# Patient Record
Sex: FEMALE | Race: BLACK OR AFRICAN AMERICAN | URBAN - METROPOLITAN AREA
[De-identification: names, ages, dates, MRNs, and addresses within clinical notes are randomized per-mention and may not be internally consistent; named-entity substitution may affect disease eponyms.]

---

## 2018-10-14 ENCOUNTER — EMERGENCY (EMERGENCY)
Facility: HOSPITAL | Age: 53
LOS: 0 days | Discharge: HOME | End: 2018-10-14
Admitting: PHYSICIAN ASSISTANT

## 2018-10-14 VITALS
HEART RATE: 72 BPM | SYSTOLIC BLOOD PRESSURE: 146 MMHG | OXYGEN SATURATION: 99 % | RESPIRATION RATE: 18 BRPM | DIASTOLIC BLOOD PRESSURE: 81 MMHG | TEMPERATURE: 97 F

## 2018-10-14 DIAGNOSIS — Z91.048 OTHER NONMEDICINAL SUBSTANCE ALLERGY STATUS: ICD-10-CM

## 2018-10-14 DIAGNOSIS — R05 COUGH: ICD-10-CM

## 2018-10-14 DIAGNOSIS — R06.02 SHORTNESS OF BREATH: ICD-10-CM

## 2018-10-14 RX ORDER — IPRATROPIUM/ALBUTEROL SULFATE 18-103MCG
3 AEROSOL WITH ADAPTER (GRAM) INHALATION ONCE
Qty: 0 | Refills: 0 | Status: COMPLETED | OUTPATIENT
Start: 2018-10-14 | End: 2018-10-14

## 2018-10-14 RX ORDER — DEXAMETHASONE 0.5 MG/5ML
10 ELIXIR ORAL ONCE
Qty: 0 | Refills: 0 | Status: COMPLETED | OUTPATIENT
Start: 2018-10-14 | End: 2018-10-14

## 2018-10-14 RX ADMIN — Medication 3 MILLILITER(S): at 19:29

## 2018-10-14 RX ADMIN — Medication 10 MILLIGRAM(S): at 19:29

## 2018-10-14 NOTE — ED PROVIDER NOTE - MEDICAL DECISION MAKING DETAILS
54 yo F with PMHx of asthma presents c/o cough, wheezing and SOB x 1 week not improved with inhaler. Pt was seen in [facility] two days ago, was given inhaler but not given steroids. Pt given neb treatment and decadron with improvement in symptoms. CXR negative. Pt to follow-up with PMD tomorrow. Return precautions provided.

## 2018-10-14 NOTE — ED PROVIDER NOTE - PHYSICAL EXAMINATION
VITAL SIGNS: I have reviewed nursing notes and confirm.  CONSTITUTIONAL: Well-developed; well-nourished; in no acute distress.  SKIN: Skin exam is warm and dry, no acute rash.  HEAD: Normocephalic; atraumatic.  EYES: Conjunctiva and sclera clear.  ENT: No nasal discharge; airway clear.   NECK: Supple; non tender.  CARD: S1, S2 normal; no murmurs, gallops, or rubs. Regular rate and rhythm.  RESP: No rales or rhonchi. Speaking in full sentences.  (+) mild wheezing B/L.   EXT: Normal ROM. No clubbing, cyanosis or edema.  NEURO: Alert, oriented. Grossly unremarkable. No focal deficits.

## 2018-10-14 NOTE — ED PROVIDER NOTE - OBJECTIVE STATEMENT
52 yo F with PMHx of asthma, RA and Sjogren's presents to the ED c/o cough, wheezing and SOB x 1 week. Pt states symptoms are usually relieved with inhaler but it has not been helping all day. Pt denies other complaints. Pt denies fever, chills, nausea, vomiting, abdominal pain, headache, weakness, chest pain, back pain, LOC, trauma, urinary symptoms, calf pain/swelling, recent travel, recent surgery.

## 2018-10-14 NOTE — ED ADULT TRIAGE NOTE - CHIEF COMPLAINT QUOTE
Pt RX albuterol INH on Friday, has been having cough x 1 week, no relief of symptoms, h/o allergy induced asthma

## 2019-01-23 ENCOUNTER — EMERGENCY (EMERGENCY)
Facility: HOSPITAL | Age: 54
LOS: 0 days | Discharge: HOME | End: 2019-01-23
Attending: EMERGENCY MEDICINE | Admitting: EMERGENCY MEDICINE

## 2019-01-23 VITALS
OXYGEN SATURATION: 100 % | DIASTOLIC BLOOD PRESSURE: 85 MMHG | TEMPERATURE: 98 F | HEART RATE: 69 BPM | RESPIRATION RATE: 18 BRPM | SYSTOLIC BLOOD PRESSURE: 129 MMHG

## 2019-01-23 VITALS
HEART RATE: 70 BPM | SYSTOLIC BLOOD PRESSURE: 127 MMHG | DIASTOLIC BLOOD PRESSURE: 72 MMHG | OXYGEN SATURATION: 97 % | TEMPERATURE: 96 F | RESPIRATION RATE: 19 BRPM

## 2019-01-23 DIAGNOSIS — E78.00 PURE HYPERCHOLESTEROLEMIA, UNSPECIFIED: ICD-10-CM

## 2019-01-23 DIAGNOSIS — J45.909 UNSPECIFIED ASTHMA, UNCOMPLICATED: ICD-10-CM

## 2019-01-23 DIAGNOSIS — R20.2 PARESTHESIA OF SKIN: ICD-10-CM

## 2019-01-23 DIAGNOSIS — Z91.048 OTHER NONMEDICINAL SUBSTANCE ALLERGY STATUS: ICD-10-CM

## 2019-01-23 LAB
ALBUMIN SERPL ELPH-MCNC: 4.3 G/DL — SIGNIFICANT CHANGE UP (ref 3.5–5.2)
ALP SERPL-CCNC: 69 U/L — SIGNIFICANT CHANGE UP (ref 30–115)
ALT FLD-CCNC: 19 U/L — SIGNIFICANT CHANGE UP (ref 0–41)
ANION GAP SERPL CALC-SCNC: 15 MMOL/L — HIGH (ref 7–14)
AST SERPL-CCNC: 20 U/L — SIGNIFICANT CHANGE UP (ref 0–41)
BASOPHILS # BLD AUTO: 0.03 K/UL — SIGNIFICANT CHANGE UP (ref 0–0.2)
BASOPHILS NFR BLD AUTO: 0.6 % — SIGNIFICANT CHANGE UP (ref 0–1)
BILIRUB SERPL-MCNC: 0.7 MG/DL — SIGNIFICANT CHANGE UP (ref 0.2–1.2)
BUN SERPL-MCNC: 16 MG/DL — SIGNIFICANT CHANGE UP (ref 10–20)
CALCIUM SERPL-MCNC: 9.7 MG/DL — SIGNIFICANT CHANGE UP (ref 8.5–10.1)
CHLORIDE SERPL-SCNC: 100 MMOL/L — SIGNIFICANT CHANGE UP (ref 98–110)
CO2 SERPL-SCNC: 26 MMOL/L — SIGNIFICANT CHANGE UP (ref 17–32)
CREAT SERPL-MCNC: 0.8 MG/DL — SIGNIFICANT CHANGE UP (ref 0.7–1.5)
EOSINOPHIL # BLD AUTO: 0.14 K/UL — SIGNIFICANT CHANGE UP (ref 0–0.7)
EOSINOPHIL NFR BLD AUTO: 3 % — SIGNIFICANT CHANGE UP (ref 0–8)
GLUCOSE SERPL-MCNC: 124 MG/DL — HIGH (ref 70–99)
HCT VFR BLD CALC: 37.3 % — SIGNIFICANT CHANGE UP (ref 37–47)
HGB BLD-MCNC: 12 G/DL — SIGNIFICANT CHANGE UP (ref 12–16)
IMM GRANULOCYTES NFR BLD AUTO: 0.2 % — SIGNIFICANT CHANGE UP (ref 0.1–0.3)
LYMPHOCYTES # BLD AUTO: 1.72 K/UL — SIGNIFICANT CHANGE UP (ref 1.2–3.4)
LYMPHOCYTES # BLD AUTO: 37.1 % — SIGNIFICANT CHANGE UP (ref 20.5–51.1)
MAGNESIUM SERPL-MCNC: 2.1 MG/DL — SIGNIFICANT CHANGE UP (ref 1.8–2.4)
MCHC RBC-ENTMCNC: 26.1 PG — LOW (ref 27–31)
MCHC RBC-ENTMCNC: 32.2 G/DL — SIGNIFICANT CHANGE UP (ref 32–37)
MCV RBC AUTO: 81.3 FL — SIGNIFICANT CHANGE UP (ref 81–99)
MONOCYTES # BLD AUTO: 0.28 K/UL — SIGNIFICANT CHANGE UP (ref 0.1–0.6)
MONOCYTES NFR BLD AUTO: 6 % — SIGNIFICANT CHANGE UP (ref 1.7–9.3)
NEUTROPHILS # BLD AUTO: 2.45 K/UL — SIGNIFICANT CHANGE UP (ref 1.4–6.5)
NEUTROPHILS NFR BLD AUTO: 53.1 % — SIGNIFICANT CHANGE UP (ref 42.2–75.2)
NRBC # BLD: 0 /100 WBCS — SIGNIFICANT CHANGE UP (ref 0–0)
PLATELET # BLD AUTO: 273 K/UL — SIGNIFICANT CHANGE UP (ref 130–400)
POTASSIUM SERPL-MCNC: 4.2 MMOL/L — SIGNIFICANT CHANGE UP (ref 3.5–5)
POTASSIUM SERPL-SCNC: 4.2 MMOL/L — SIGNIFICANT CHANGE UP (ref 3.5–5)
PROT SERPL-MCNC: 7.1 G/DL — SIGNIFICANT CHANGE UP (ref 6–8)
RBC # BLD: 4.59 M/UL — SIGNIFICANT CHANGE UP (ref 4.2–5.4)
RBC # FLD: 15.4 % — HIGH (ref 11.5–14.5)
SODIUM SERPL-SCNC: 141 MMOL/L — SIGNIFICANT CHANGE UP (ref 135–146)
WBC # BLD: 4.63 K/UL — LOW (ref 4.8–10.8)
WBC # FLD AUTO: 4.63 K/UL — LOW (ref 4.8–10.8)

## 2019-01-23 NOTE — ED PROVIDER NOTE - OBJECTIVE STATEMENT
54 y/o F with PMH seasonal asthma, RA not on any meds including steroids or immunosuppressants, Sjogren's, HLD presents with b/l humeral, top of head, and L shin warm/tingling sensation x days. called PMD who recommended ED for eval. Denies CP, palpitations, SOB, back pain, abdominal pain, n/v/d, black or bloody stools, fevers, sweats, chills, HA, vision changes, confusion, trauma, fall, cough, recent travel, recent illness, sick contacts, leg pain/swelling, urinary symptoms, rash. pt does also relate she has hx of vertiginous symptoms and is followed by neuro, last brain MRI 2 years ago and WNL.

## 2019-01-23 NOTE — ED PROVIDER NOTE - NS ED ROS FT
Review of Systems    Constitutional: (-) fever  Eyes/ENT: (-) blurry vision  Cardiovascular: (-) chest pain, (-) syncope  Respiratory: (-) cough, (-) shortness of breath  Gastrointestinal: (-) vomiting, (-) diarrhea  Musculoskeletal: (-) neck pain, (-) back pain  Integumentary: (-) rash, (-) edema  Neurological: (-) headache    **All other ROS negative except as per above/HPI**

## 2019-01-23 NOTE — ED PROVIDER NOTE - NSFOLLOWUPINSTRUCTIONS_ED_ALL_ED_FT
Paresthesia  ImageParesthesia is a burning or prickling feeling. This feeling can happen in any part of the body. It often happens in the hands, arms, legs, or feet. Usually, it is not painful. In most cases, the feeling goes away in a short time and is not a sign of a serious problem.    Follow these instructions at home:  Avoid drinking alcohol.  Try massage or needle therapy (acupuncture) to help with your problems.  Keep all follow-up visits as told by your doctor. This is important.  Contact a doctor if:  You keep on having episodes of paresthesia.  Your burning or prickling feeling gets worse when you walk.  You have pain or cramps.  You feel dizzy.  You have a rash.  Get help right away if:  You feel weak.  You have trouble walking or moving.  You have problems speaking, understanding, or seeing.  You feel confused.  You cannot control when you pee (urinate) or poop (bowel movement).  You lose feeling (numbness) after an injury.  You pass out (faint).  This information is not intended to replace advice given to you by your health care provider. Make sure you discuss any questions you have with your health care provider.

## 2019-01-23 NOTE — ED PROVIDER NOTE - PROGRESS NOTE DETAILS
Reviewed all results and necessity for follow up. Counseled on red flags and to return for them.  Patient appears well on discharge. pt understands need to follow with neuro, and has neuro that she follows with .

## 2019-01-23 NOTE — ED PROVIDER NOTE - PHYSICAL EXAMINATION
PHYSICAL EXAM:    GENERAL: Alert, appears stated age, well appearing, non-toxic  SKIN: Warm, pink and dry. MMM.   EYE: Normal lids/conjunctiva, PERRL, EOMI  ENT: Normal hearing, patent oropharynx  NECK: +supple. No meningismus   Pulm: Bilateral BS, normal resp effort, no wheezes, stridor, or retractions  CV: RRR, no M/R/G, 2+and = radial pulses  Abd: soft, non-tender, non-distended  Mskel: no erythema, cyanosis, edema. no calf tenderness  Neuro: AAOx3, no sensory/motor deficits, CN 2-12 intact. No speech slurring, pronator drift, facial asymmetry. normal finger-to-nose b/l. 5/5 strength throughout. normal gait. negative romberg.

## 2019-01-23 NOTE — ED PROVIDER NOTE - ATTENDING CONTRIBUTION TO CARE
52 yo f hx of RA, c/o tingling, sharp pain, lasts for a few seconds, intermitt for 2 days, on her scalp, b/l arms and left leg. no trauma, numbness, weakness. no ha, vis or speech changes. no n, v, cp, sob or back pain. pt in nad, ncat, perrl, eomi, CNs nml, neck sup, no bruits or thrills, ctab, rrr, ab soft, nt, nd. Alert and oriented, face symmetric and speech fluent. Strength 5/5 x 4 ext and symmetric, nml gross motor movement, nml RRM/ANALISA/FTN, nml gait. nml 2 pt discrim. No focal deficits noted.

## 2019-01-23 NOTE — ED PROVIDER NOTE - NSFOLLOWUPCLINICS_GEN_ALL_ED_FT
A Family Medicine Doctor  Family Medicine  .  [place]   Phone:   Fax:   Follow Up Time: 1-3 Days    [facility]  Neurology  [address] [place] [address], [facility] [address]  [place] [address]  Phone: [phone]  Fax:   Follow Up Time: 1-3 Days

## 2019-01-24 PROBLEM — J45.909 UNSPECIFIED ASTHMA, UNCOMPLICATED: Chronic | Status: ACTIVE | Noted: 2018-10-14

## 2019-03-19 ENCOUNTER — EMERGENCY (EMERGENCY)
Facility: HOSPITAL | Age: 54
LOS: 0 days | Discharge: HOME | End: 2019-03-20
Attending: EMERGENCY MEDICINE | Admitting: EMERGENCY MEDICINE

## 2019-03-19 VITALS
HEART RATE: 81 BPM | DIASTOLIC BLOOD PRESSURE: 63 MMHG | RESPIRATION RATE: 18 BRPM | TEMPERATURE: 97 F | SYSTOLIC BLOOD PRESSURE: 127 MMHG | OXYGEN SATURATION: 100 %

## 2019-03-19 VITALS
TEMPERATURE: 97 F | SYSTOLIC BLOOD PRESSURE: 130 MMHG | HEART RATE: 87 BPM | RESPIRATION RATE: 18 BRPM | OXYGEN SATURATION: 97 % | DIASTOLIC BLOOD PRESSURE: 77 MMHG

## 2019-03-19 DIAGNOSIS — J98.01 ACUTE BRONCHOSPASM: ICD-10-CM

## 2019-03-19 DIAGNOSIS — R05 COUGH: ICD-10-CM

## 2019-03-19 DIAGNOSIS — J45.909 UNSPECIFIED ASTHMA, UNCOMPLICATED: ICD-10-CM

## 2019-03-19 DIAGNOSIS — Z79.899 OTHER LONG TERM (CURRENT) DRUG THERAPY: ICD-10-CM

## 2019-03-19 DIAGNOSIS — Z79.52 LONG TERM (CURRENT) USE OF SYSTEMIC STEROIDS: ICD-10-CM

## 2019-03-19 RX ORDER — IPRATROPIUM/ALBUTEROL SULFATE 18-103MCG
3 AEROSOL WITH ADAPTER (GRAM) INHALATION ONCE
Qty: 0 | Refills: 0 | Status: COMPLETED | OUTPATIENT
Start: 2019-03-19 | End: 2019-03-19

## 2019-03-19 RX ADMIN — Medication 3 MILLILITER(S): at 22:11

## 2019-03-19 RX ADMIN — Medication 3 MILLILITER(S): at 23:27

## 2019-03-19 NOTE — ED ADULT TRIAGE NOTE - CHIEF COMPLAINT QUOTE
Pt with C/O flu like symptoms coughing sinus and chest congestion . on po antibiotic for DX- PNA still not filling well .

## 2019-03-19 NOTE — ED ADULT NURSE NOTE - NSIMPLEMENTINTERV_GEN_ALL_ED
Implemented All Universal Safety Interventions:  [place] to call system. Call bell, personal items and telephone within reach. Instruct patient to call for assistance. Room bathroom lighting operational. Non-slip footwear when patient is off stretcher. Physically safe environment: no spills, clutter or unnecessary equipment. Stretcher in lowest position, wheels locked, appropriate side rails in place.

## 2019-03-20 PROBLEM — M53.9 DORSOPATHY, UNSPECIFIED: Chronic | Status: ACTIVE | Noted: 2019-01-23

## 2019-03-20 PROBLEM — R42 DIZZINESS AND GIDDINESS: Chronic | Status: ACTIVE | Noted: 2019-01-23

## 2019-03-20 PROBLEM — M06.9 RHEUMATOID ARTHRITIS, UNSPECIFIED: Chronic | Status: ACTIVE | Noted: 2019-01-23

## 2019-03-20 PROBLEM — E78.00 PURE HYPERCHOLESTEROLEMIA, UNSPECIFIED: Chronic | Status: ACTIVE | Noted: 2019-01-23

## 2019-03-20 RX ORDER — ALBUTEROL 90 UG/1
2 AEROSOL, METERED ORAL
Qty: 1 | Refills: 0
Start: 2019-03-20 | End: 2019-04-18

## 2019-03-20 RX ORDER — ALBUTEROL 90 UG/1
1 AEROSOL, METERED ORAL ONCE
Qty: 0 | Refills: 0 | Status: DISCONTINUED | OUTPATIENT
Start: 2019-03-20 | End: 2019-03-20

## 2019-03-20 NOTE — ED PROVIDER NOTE - NS ED ROS FT
Constitutional: no fever, chills, no recent weight loss, change in appetite or malaise  Cardiac: No chest pain, SOB or edema.  Respiratory: see HPI  ENT: nasal congestion, laryngitis, no throat or ear pain  GI: No nausea, vomiting, diarrhea or abdominal pain.  : No dysuria, frequency, urgency or hematuria  MS: no pain to back or extremities, no loss of ROM, no weakness  Neuro: No headache or weakness. No LOC.  Skin: No skin rash.  Except as documented in the HPI, all other systems are negative.

## 2019-03-20 NOTE — ED PROVIDER NOTE - CARE PROVIDER_API CALL
Preston Rice (MD)  Internal Medicine  [address] [place] [address]  Phone: [phone]  Fax: [phone]  Follow Up Time:

## 2019-03-20 NOTE — ED PROVIDER NOTE - CLINICAL SUMMARY MEDICAL DECISION MAKING FREE TEXT BOX
The patient received duoneb x 2, cough improving, no longer wheezing, subjectively states she is able to take a much deeper breath.     Likely post viral cough, XR does not demonstrate localized pneumonia.     Patient will follow up with Dr. Rice, contineued prescribed meds, will dc with albuterol MDI and close return precautions.

## 2019-03-20 NOTE — ED PROVIDER NOTE - OBJECTIVE STATEMENT
52 yo F, history of RA, Sjogren's disease not on any DMARDs, allergy related asthma, recently diagnosed with flu and then ?PNA, here for assessment of persistent cough since treatment of PNA. Reports cough productive of clear/white sputum, worse at night, associated post nasal drip, laryngitis. No CP.     Had fevers with initial sx, no fever x 5 days.     No nausea, vomiting, diarrhea or abdominal pain.    No recent travel, sick contacts, got flu shot.    Was seen by Dr. Rice for sx 10 days ago, completed course of cephalosporin and prednisone. No albuterol. Spoke with him over the phone regarding persistent cough, given rx for additional prednisone course and was started on levoquin.

## 2019-03-20 NOTE — ED PROVIDER NOTE - PHYSICAL EXAMINATION
VITAL SIGNS: I have reviewed nursing notes and confirm.  CONSTITUTIONAL: Well-developed; well-nourished; in no acute distress, well hydrated  SKIN: Skin exam is warm and dry, no acute rash, good turgor  HEAD: Normocephalic; atraumatic.  EYES: PERRL, EOM intact; conjunctiva and sclera clear.  ENT: clear rhinorrhea, edematous turbinates, no pharyngeal eyrthema, normal appearing TMs  NECK: Supple; non tender, no significant adenopathy  CARD: S1, S2 normal; no murmurs, gallops, or rubs. Regular rate and rhythm.  RESP: trace wheezing diffusely, rales or rhonchi.  ABD: Normal bowel sounds; soft; non-distended; non-tender  EXT: Normal ROM.   NEURO: Alert, oriented. Grossly unremarkable. No focal deficits.  PSYCH: Cooperative, appropriate.

## 2019-03-21 NOTE — ED POST DISCHARGE NOTE - RESULT SUMMARY
CXR- LEFT LOWER LOBE OPACITY MAY REFLECT PNA. ON LEVAQUIN BY DR. NASH. WILL ADVISE F/U WITH DR. NASH WITHIN 24 HOURS.

## 2019-09-11 PROBLEM — Z00.00 ENCOUNTER FOR PREVENTIVE HEALTH EXAMINATION: Status: ACTIVE | Noted: 2019-09-11

## 2019-09-20 ENCOUNTER — EMERGENCY (EMERGENCY)
Facility: HOSPITAL | Age: 54
LOS: 0 days | Discharge: HOME | End: 2019-09-20
Attending: EMERGENCY MEDICINE | Admitting: EMERGENCY MEDICINE
Payer: COMMERCIAL

## 2019-09-20 VITALS
RESPIRATION RATE: 18 BRPM | OXYGEN SATURATION: 100 % | HEART RATE: 73 BPM | DIASTOLIC BLOOD PRESSURE: 73 MMHG | SYSTOLIC BLOOD PRESSURE: 124 MMHG | TEMPERATURE: 98 F

## 2019-09-20 VITALS
HEIGHT: 55 IN | TEMPERATURE: 98 F | HEART RATE: 63 BPM | SYSTOLIC BLOOD PRESSURE: 133 MMHG | WEIGHT: 229.94 LBS | DIASTOLIC BLOOD PRESSURE: 76 MMHG | OXYGEN SATURATION: 100 % | RESPIRATION RATE: 18 BRPM

## 2019-09-20 DIAGNOSIS — R07.9 CHEST PAIN, UNSPECIFIED: ICD-10-CM

## 2019-09-20 DIAGNOSIS — R51 HEADACHE: ICD-10-CM

## 2019-09-20 DIAGNOSIS — M06.9 RHEUMATOID ARTHRITIS, UNSPECIFIED: ICD-10-CM

## 2019-09-20 DIAGNOSIS — E78.5 HYPERLIPIDEMIA, UNSPECIFIED: ICD-10-CM

## 2019-09-20 DIAGNOSIS — R11.0 NAUSEA: ICD-10-CM

## 2019-09-20 DIAGNOSIS — R55 SYNCOPE AND COLLAPSE: ICD-10-CM

## 2019-09-20 DIAGNOSIS — R42 DIZZINESS AND GIDDINESS: ICD-10-CM

## 2019-09-20 DIAGNOSIS — H81.90 UNSPECIFIED DISORDER OF VESTIBULAR FUNCTION, UNSPECIFIED EAR: ICD-10-CM

## 2019-09-20 LAB
ALBUMIN SERPL ELPH-MCNC: 4.3 G/DL — SIGNIFICANT CHANGE UP (ref 3.5–5.2)
ALP SERPL-CCNC: 66 U/L — SIGNIFICANT CHANGE UP (ref 30–115)
ALT FLD-CCNC: 17 U/L — SIGNIFICANT CHANGE UP (ref 0–41)
ANION GAP SERPL CALC-SCNC: 9 MMOL/L — SIGNIFICANT CHANGE UP (ref 7–14)
AST SERPL-CCNC: 21 U/L — SIGNIFICANT CHANGE UP (ref 0–41)
BILIRUB SERPL-MCNC: 1 MG/DL — SIGNIFICANT CHANGE UP (ref 0.2–1.2)
BUN SERPL-MCNC: 14 MG/DL — SIGNIFICANT CHANGE UP (ref 10–20)
CALCIUM SERPL-MCNC: 9.2 MG/DL — SIGNIFICANT CHANGE UP (ref 8.5–10.1)
CHLORIDE SERPL-SCNC: 107 MMOL/L — SIGNIFICANT CHANGE UP (ref 98–110)
CO2 SERPL-SCNC: 26 MMOL/L — SIGNIFICANT CHANGE UP (ref 17–32)
CREAT SERPL-MCNC: 0.8 MG/DL — SIGNIFICANT CHANGE UP (ref 0.7–1.5)
GLUCOSE SERPL-MCNC: 95 MG/DL — SIGNIFICANT CHANGE UP (ref 70–99)
HCT VFR BLD CALC: 38.7 % — SIGNIFICANT CHANGE UP (ref 37–47)
HGB BLD-MCNC: 12 G/DL — SIGNIFICANT CHANGE UP (ref 12–16)
MCHC RBC-ENTMCNC: 25.7 PG — LOW (ref 27–31)
MCHC RBC-ENTMCNC: 31 G/DL — LOW (ref 32–37)
MCV RBC AUTO: 82.9 FL — SIGNIFICANT CHANGE UP (ref 81–99)
NRBC # BLD: 0 /100 WBCS — SIGNIFICANT CHANGE UP (ref 0–0)
PLATELET # BLD AUTO: 251 K/UL — SIGNIFICANT CHANGE UP (ref 130–400)
POTASSIUM SERPL-MCNC: 4.5 MMOL/L — SIGNIFICANT CHANGE UP (ref 3.5–5)
POTASSIUM SERPL-SCNC: 4.5 MMOL/L — SIGNIFICANT CHANGE UP (ref 3.5–5)
PROT SERPL-MCNC: 6.9 G/DL — SIGNIFICANT CHANGE UP (ref 6–8)
RBC # BLD: 4.67 M/UL — SIGNIFICANT CHANGE UP (ref 4.2–5.4)
RBC # FLD: 15.6 % — HIGH (ref 11.5–14.5)
SODIUM SERPL-SCNC: 142 MMOL/L — SIGNIFICANT CHANGE UP (ref 135–146)
TROPONIN T SERPL-MCNC: <0.01 NG/ML — SIGNIFICANT CHANGE UP
WBC # BLD: 3.69 K/UL — LOW (ref 4.8–10.8)
WBC # FLD AUTO: 3.69 K/UL — LOW (ref 4.8–10.8)

## 2019-09-20 PROCEDURE — 71046 X-RAY EXAM CHEST 2 VIEWS: CPT | Mod: 26

## 2019-09-20 PROCEDURE — 93970 EXTREMITY STUDY: CPT | Mod: 26

## 2019-09-20 PROCEDURE — 75574 CT ANGIO HRT W/3D IMAGE: CPT | Mod: 26

## 2019-09-20 PROCEDURE — 93010 ELECTROCARDIOGRAM REPORT: CPT

## 2019-09-20 PROCEDURE — 99236 HOSP IP/OBS SAME DATE HI 85: CPT

## 2019-09-20 RX ORDER — ACETAMINOPHEN 500 MG
650 TABLET ORAL ONCE
Refills: 0 | Status: COMPLETED | OUTPATIENT
Start: 2019-09-20 | End: 2019-09-20

## 2019-09-20 RX ADMIN — Medication 650 MILLIGRAM(S): at 11:25

## 2019-09-20 NOTE — ED PROVIDER NOTE - PHYSICAL EXAMINATION
Constitutional: Well developed, well nourished. NAD  Head: Normocephalic, atraumatic.  Eyes: PERRL, EOMI.  ENT: No nasal discharge. Mucous membranes dry.  Neck: Supple. Painless ROM.  Cardiovascular: Normal S1, S2. Regular rate and rhythm. No murmurs, rubs, or gallops.  Pulmonary:  Lungs clear to auscultation bilaterally.   Abdominal: Soft. Nondistended. No rebound, guarding, rigidity.  Extremities. Pelvis stable. No lower extremity edema, symmetric calves.  Skin: No rashes, cyanosis.  Neuro: AAOx3. No focal neurological deficits.  Psych: Normal mood. Normal affect.

## 2019-09-20 NOTE — ED ADULT NURSE NOTE - OBJECTIVE STATEMENT
patient complaints of chest pain, tightness and intermittent SOB. Patient reports headache and feeling a burning sensation in her head. Patient denies n/v.  Patient reports lightheadness.

## 2019-09-20 NOTE — ED PROVIDER NOTE - ATTENDING CONTRIBUTION TO CARE
I personally evaluated the patient. I reviewed the Resident’s or Physician Assistant’s note (as assigned above), and agree with the findings and plan except as documented in my note.  54 yr F with complaints of non radiating CP, SOB. Reports feeling lightheaded, +  syncope 1 week ago. No aggravating or alleviating factors. VS reviewed, pt non-toxic appearing, NAD. Head ncat, MMM, pharyngeal exam w/o erythema, edema or exudates. B/l TM wnl. neck supple, normal ROM, normal s1s2 without any murmurs, Lungs CTAB with normal work of breathing. abd +BS, s/nd/nt, extremities wnl, neuro exam grossly normal. No acute skin rashes. Plan is EKG, labs, CXR, cardiac monitoring and will reassess.

## 2019-09-20 NOTE — ED CDU PROVIDER DISPOSITION NOTE - CLINICAL COURSE
55yo woman h/o HLD, RA (no meds), vestibulopathy c/o typical vestibular sx (for her) over the past couple of weeks with dizziness, nausea, clammy sensation. She has had recurrent sx over the past 12 years, usually with the change of season or with highly humid weather. Last night pt woke up feeling SOB with some mild chest tightness and became concerned. No wheezing or cough, sx lasted about 30 minutes. VS, exam as noted, nontoxic appearing and comfortable, normal work of breathing, lungs CTA, CSV1S2 RRR abd soft, NT, no peripheral edema. Mild L calf tenderness which pt says has been chronic for months, no overlying erythema. Pt was monitored in CDU wihtout incident, repeat EKG/enzymes unchanged. Duplex negative for DVT. CCTA with CAD-RAD 0. Pt feeling better at time of dispo, d/c to f/u PMD. Pt comfortable with d/c.

## 2019-09-20 NOTE — ED CDU PROVIDER DISPOSITION NOTE - NS ED ATTENDING STATEMENT MOD
/111
42 beats of AIVR noted on telemetry at 22:24. Patient asleep in bed at time of ectopy. Patient awakened for vital sign assessment. Patient asymptomatic. Returned to sinus rhythm with HR 92 bpm.
Patient has a positive blood culture with gram positive cocci.
Patient has been experiencing seizures lasting more than 5 minutes s/p 1mg ativan IVP.
fingerstick 450 at 21:49 with 4 units of Humalog insulin given per corrective regimen sliding scale
fingerstick re-check at 23:38 resulted 377
further directions needed for heparin drip
ptt in goal range x 1, heparin drip currently at 13ml/hr
I have personally performed a face to face diagnostic evaluation on this patient. I have reviewed the ACP note and agree with the history, exam and plan of care, except as noted.

## 2019-09-20 NOTE — ED CDU PROVIDER DISPOSITION NOTE - CARE PROVIDER_API CALL
Derrell Perez)  Cardiovascular Disease; Internal Medicine  [address] [place] [address]  Phone: [phone]  Fax: [phone]  Follow Up Time:

## 2019-09-20 NOTE — ED ADULT NURSE NOTE - CHIEF COMPLAINT QUOTE
c/o sob x few days. c/o chest tightness since tonight. also c/o heacaches. pt states " I feel like my head is on fire".

## 2019-09-20 NOTE — ED PROVIDER NOTE - OBJECTIVE STATEMENT
54 yold female to Ed Pmhx Vestibulopathy, HLd, c/o lightheaded, dizzy, Ha, nausea and near syncope x 1 week came specifically today because she had chest tightness with mild sob; pt with extensive neuro workup in past with DR. Randle for vertigo; no specific cause found; pt also seen in local [facility] given meclazine 2 days ago(has not started yet); pt denies fever, chills, cough; pt last workup for cP ~5 yrs ago

## 2019-09-20 NOTE — ED CDU PROVIDER INITIAL DAY NOTE - OBJECTIVE STATEMENT
55y/o female with pmh of hld, vestibulopathy, pt. c/o feeling near syncopal, ha, dizziness and nausea. pt. states all these symptoms are consistent with her vestibulopathy. 2 days ago pt. went to urgent care and was given rx for meclizine and gabapentin. pt. filled it in but did not start taking it. the reason pt. came to er today is because of cp associated with sob and left leg pain. denies fever, chills, cough, abdominal pain. not a smoker. no cardiologist. pt. had a ccta 5 years ago. 53y/o female with pmh of hld, vestibulopathy, pt. c/o feeling near syncopal, ha, dizziness and nausea. pt. states all these symptoms are consistent with her vestibulopathy. 2 days ago pt. went to urgent care and was given rx for meclizine and gabapentin. pt. filled it in but did not start taking it. the reason pt. came to er today is because of cp associated with sob and left leg pain. denies fever, chills, cough, abdominal pain. pt. also denies hormone use, recent travel, hx of cancers, no hemoptysis. pt.'s younger sister was diagnosed with a dvt. pt. is not a smoker. no cardiologist. pt. had a ccta 5 years ago.

## 2019-09-20 NOTE — ED CDU PROVIDER INITIAL DAY NOTE - PROGRESS NOTE DETAILS
pt seen bedside, NAD, no complaints came in due to her SOB yesterday pt being worked up for r/o DVT will follow pending duplex and continue to monitor patient.

## 2019-09-20 NOTE — ED CDU PROVIDER INITIAL DAY NOTE - MUSCULOSKELETAL, MLM
Spine appears normal, range of motion is not limited, no muscle or joint tenderness. no swelling to LE, no calf ttp.

## 2019-09-20 NOTE — ED CDU PROVIDER INITIAL DAY NOTE - MEDICAL DECISION MAKING DETAILS
53yo woman h/o HLD, RA (no meds), vestibulopathy c/o typical vestibular sx (for her) over the past couple of weeks with dizziness, nausea, clammy sensation. She has had recurrent sx over the past 12 years, usually with the change of season or with highly humid weather. Last night pt woke up feeling SOB with some mild chest tightness and became concerned. No wheezing or cough, sx lasted about 30 minutes. VS, exam as noted, nontoxic appearing and comfortable, normal work of breathing, lungs CTA, CSV1S2 RRR abd soft, NT, no peripheral edema. Mild L calf tenderness which pt says has been chronic for months, no overlying erythema. Doubt PE, but will check duplex due to calf pain. Plan is for serial EKG/enzymes, CCTA, reassess.

## 2019-09-20 NOTE — ED ADULT TRIAGE NOTE - CHIEF COMPLAINT QUOTE
Left detailed message and requested a call back to office.  Hours provided.  Lab order faxed to [facility]. c/o sob x few days. c/o chest tightness since tonight. also c/o heacaches. pt states " I feel like my head is on fire".

## 2019-09-20 NOTE — ED ADULT NURSE REASSESSMENT NOTE - NS ED NURSE REASSESS COMMENT FT1
patient placed in observation for further observation for chest pain. Patient remains on cardiac monitor.  Patient denies pain at this time. Patient denies SOB.  Patient resting in bed with no difficulty breathing noted. Will continue to monitor.
pt assessed, no signs of distress, cont cardiac monitoring maintained, awaiting venous duplex, will cont to assess and monitor.

## 2019-09-20 NOTE — ED PROVIDER NOTE - CLINICAL SUMMARY MEDICAL DECISION MAKING FREE TEXT BOX
Pt with complaints of CP and SOB. Required labs, imaging, monitoring in ED. Will place in EDOU for further management.

## 2019-09-20 NOTE — ED CDU PROVIDER DISPOSITION NOTE - ATTENDING CONTRIBUTION TO CARE
53yo woman h/o HLD, RA (no meds), vestibulopathy c/o typical vestibular sx (for her) over the past couple of weeks with dizziness, nausea, clammy sensation. She has had recurrent sx over the past 12 years, usually with the change of season or with highly humid weather. Last night pt woke up feeling SOB with some mild chest tightness and became concerned. No wheezing or cough, sx lasted about 30 minutes. VS, exam as noted, nontoxic appearing and comfortable, normal work of breathing, lungs CTA, CSV1S2 RRR abd soft, NT, no peripheral edema. Mild L calf tenderness which pt says has been chronic for months, no overlying erythema. Pt was monitored in CDU wihtout incident, repeat EKG/enzymes unchanged. Duplex negative for DVT. CCTA with CAD-RAD 0. Pt feeling better at time of dispo, d/c to f/u PMD. Pt comfortable with d/c.

## 2019-09-20 NOTE — ED CDU PROVIDER DISPOSITION NOTE - PATIENT PORTAL LINK FT
You can access the FollowMyHealth Patient Portal offered by [facility] by registering at the following website: [email or web address]. By joining [facility]’s FollowMyHealth portal, you will also be able to view your health information using other applications (apps) compatible with our system.

## 2019-09-20 NOTE — ED CDU PROVIDER INITIAL DAY NOTE - ATTENDING CONTRIBUTION TO CARE
55yo woman h/o HLD, RA (no meds), vestibulopathy c/o typical vestibular sx (for her) over the past couple of weeks with dizziness, nausea, clammy sensation. She has had recurrent sx over the past 12 years, usually with the change of season or with highly humid weather. Last night pt woke up feeling SOB with some mild chest tightness and became concerned. No wheezing or cough, sx lasted about 30 minutes. VS, exam as noted, nontoxic appearing and comfortable, normal work of breathing, lungs CTA, CSV1S2 RRR abd soft, NT, no peripheral edema. Mild L calf tenderness which pt says has been chronic for months, no overlying erythema. Doubt PE, but will check duplex due to calf pain. Plan is for serial EKG/enzymes, CCTA, reassess.

## 2019-12-23 ENCOUNTER — APPOINTMENT (OUTPATIENT)
Dept: NEUROLOGY | Facility: CLINIC | Age: 54
End: 2019-12-23

## 2020-04-20 ENCOUNTER — EMERGENCY (EMERGENCY)
Facility: HOSPITAL | Age: 55
LOS: 0 days | Discharge: HOME | End: 2020-04-20
Attending: EMERGENCY MEDICINE | Admitting: EMERGENCY MEDICINE
Payer: COMMERCIAL

## 2020-04-20 VITALS
OXYGEN SATURATION: 100 % | TEMPERATURE: 97 F | SYSTOLIC BLOOD PRESSURE: 180 MMHG | HEART RATE: 100 BPM | DIASTOLIC BLOOD PRESSURE: 92 MMHG | RESPIRATION RATE: 20 BRPM

## 2020-04-20 DIAGNOSIS — K21.9 GASTRO-ESOPHAGEAL REFLUX DISEASE WITHOUT ESOPHAGITIS: ICD-10-CM

## 2020-04-20 DIAGNOSIS — R06.02 SHORTNESS OF BREATH: ICD-10-CM

## 2020-04-20 DIAGNOSIS — R07.89 OTHER CHEST PAIN: ICD-10-CM

## 2020-04-20 DIAGNOSIS — R10.13 EPIGASTRIC PAIN: ICD-10-CM

## 2020-04-20 DIAGNOSIS — J45.909 UNSPECIFIED ASTHMA, UNCOMPLICATED: ICD-10-CM

## 2020-04-20 LAB
ALBUMIN SERPL ELPH-MCNC: 4.8 G/DL — SIGNIFICANT CHANGE UP (ref 3.5–5.2)
ALP SERPL-CCNC: 69 U/L — SIGNIFICANT CHANGE UP (ref 30–115)
ALT FLD-CCNC: 21 U/L — SIGNIFICANT CHANGE UP (ref 0–41)
ANION GAP SERPL CALC-SCNC: 13 MMOL/L — SIGNIFICANT CHANGE UP (ref 7–14)
APPEARANCE UR: CLEAR — SIGNIFICANT CHANGE UP
AST SERPL-CCNC: 30 U/L — SIGNIFICANT CHANGE UP (ref 0–41)
BASOPHILS # BLD AUTO: 0.04 K/UL — SIGNIFICANT CHANGE UP (ref 0–0.2)
BASOPHILS NFR BLD AUTO: 0.9 % — SIGNIFICANT CHANGE UP (ref 0–1)
BILIRUB DIRECT SERPL-MCNC: <0.2 MG/DL — SIGNIFICANT CHANGE UP (ref 0–0.2)
BILIRUB INDIRECT FLD-MCNC: >0.8 MG/DL — SIGNIFICANT CHANGE UP (ref 0.2–1.2)
BILIRUB SERPL-MCNC: 1 MG/DL — SIGNIFICANT CHANGE UP (ref 0.2–1.2)
BILIRUB UR-MCNC: NEGATIVE — SIGNIFICANT CHANGE UP
BUN SERPL-MCNC: 7 MG/DL — LOW (ref 10–20)
CALCIUM SERPL-MCNC: 9.7 MG/DL — SIGNIFICANT CHANGE UP (ref 8.5–10.1)
CHLORIDE SERPL-SCNC: 101 MMOL/L — SIGNIFICANT CHANGE UP (ref 98–110)
CO2 SERPL-SCNC: 24 MMOL/L — SIGNIFICANT CHANGE UP (ref 17–32)
COLOR SPEC: SIGNIFICANT CHANGE UP
CREAT SERPL-MCNC: 0.7 MG/DL — SIGNIFICANT CHANGE UP (ref 0.7–1.5)
DIFF PNL FLD: NEGATIVE — SIGNIFICANT CHANGE UP
EOSINOPHIL # BLD AUTO: 0.12 K/UL — SIGNIFICANT CHANGE UP (ref 0–0.7)
EOSINOPHIL NFR BLD AUTO: 2.6 % — SIGNIFICANT CHANGE UP (ref 0–8)
GLUCOSE SERPL-MCNC: 92 MG/DL — SIGNIFICANT CHANGE UP (ref 70–99)
GLUCOSE UR QL: NEGATIVE — SIGNIFICANT CHANGE UP
HCT VFR BLD CALC: 41.9 % — SIGNIFICANT CHANGE UP (ref 37–47)
HGB BLD-MCNC: 13.8 G/DL — SIGNIFICANT CHANGE UP (ref 12–16)
IMM GRANULOCYTES NFR BLD AUTO: 0.2 % — SIGNIFICANT CHANGE UP (ref 0.1–0.3)
KETONES UR-MCNC: SIGNIFICANT CHANGE UP
LEUKOCYTE ESTERASE UR-ACNC: NEGATIVE — SIGNIFICANT CHANGE UP
LIDOCAIN IGE QN: 34 U/L — SIGNIFICANT CHANGE UP (ref 7–60)
LYMPHOCYTES # BLD AUTO: 1.32 K/UL — SIGNIFICANT CHANGE UP (ref 1.2–3.4)
LYMPHOCYTES # BLD AUTO: 28.5 % — SIGNIFICANT CHANGE UP (ref 20.5–51.1)
MCHC RBC-ENTMCNC: 27.2 PG — SIGNIFICANT CHANGE UP (ref 27–31)
MCHC RBC-ENTMCNC: 32.9 G/DL — SIGNIFICANT CHANGE UP (ref 32–37)
MCV RBC AUTO: 82.5 FL — SIGNIFICANT CHANGE UP (ref 81–99)
MONOCYTES # BLD AUTO: 0.32 K/UL — SIGNIFICANT CHANGE UP (ref 0.1–0.6)
MONOCYTES NFR BLD AUTO: 6.9 % — SIGNIFICANT CHANGE UP (ref 1.7–9.3)
NEUTROPHILS # BLD AUTO: 2.82 K/UL — SIGNIFICANT CHANGE UP (ref 1.4–6.5)
NEUTROPHILS NFR BLD AUTO: 60.9 % — SIGNIFICANT CHANGE UP (ref 42.2–75.2)
NITRITE UR-MCNC: NEGATIVE — SIGNIFICANT CHANGE UP
NRBC # BLD: 0 /100 WBCS — SIGNIFICANT CHANGE UP (ref 0–0)
PH UR: 6 — SIGNIFICANT CHANGE UP (ref 5–8)
PLATELET # BLD AUTO: 234 K/UL — SIGNIFICANT CHANGE UP (ref 130–400)
POTASSIUM SERPL-MCNC: 5.3 MMOL/L — HIGH (ref 3.5–5)
POTASSIUM SERPL-SCNC: 5.3 MMOL/L — HIGH (ref 3.5–5)
PROT SERPL-MCNC: 8.1 G/DL — HIGH (ref 6–8)
PROT UR-MCNC: NEGATIVE — SIGNIFICANT CHANGE UP
RBC # BLD: 5.08 M/UL — SIGNIFICANT CHANGE UP (ref 4.2–5.4)
RBC # FLD: 14.6 % — HIGH (ref 11.5–14.5)
SODIUM SERPL-SCNC: 138 MMOL/L — SIGNIFICANT CHANGE UP (ref 135–146)
SP GR SPEC: 1.01 — SIGNIFICANT CHANGE UP (ref 1.01–1.02)
UROBILINOGEN FLD QL: SIGNIFICANT CHANGE UP
WBC # BLD: 4.63 K/UL — LOW (ref 4.8–10.8)
WBC # FLD AUTO: 4.63 K/UL — LOW (ref 4.8–10.8)

## 2020-04-20 PROCEDURE — 99285 EMERGENCY DEPT VISIT HI MDM: CPT

## 2020-04-20 PROCEDURE — 93010 ELECTROCARDIOGRAM REPORT: CPT

## 2020-04-20 PROCEDURE — 71045 X-RAY EXAM CHEST 1 VIEW: CPT | Mod: 26

## 2020-04-20 RX ORDER — FAMOTIDINE 10 MG/ML
1 INJECTION INTRAVENOUS
Qty: 30 | Refills: 0
Start: 2020-04-20 | End: 2020-05-19

## 2020-04-20 NOTE — ED PROVIDER NOTE - PHYSICAL EXAMINATION
CONSTITUTIONAL: Well-developed; well-nourished; in no acute distress.   SKIN: warm, dry  HEAD: Normocephalic; atraumatic.  EYES: no conjunctival injection. PERRLA. EOMI.   ENT: No nasal discharge; airway clear.  NECK: Supple; non tender.  CARD: S1, S2 normal; no murmurs, gallops, or rubs. Regular rate and rhythm.   RESP: No wheezes, rales or rhonchi.  ABD: soft ntnd. no guarding. no cva tenderness.   EXT: Normal ROM.  No clubbing, cyanosis or edema.   LYMPH: No acute cervical adenopathy.  NEURO: Alert, oriented, grossly unremarkable.  PSYCH: Cooperative, appropriate.

## 2020-04-20 NOTE — ED PROVIDER NOTE - PROGRESS NOTE DETAILS
55 y/o F with PMHx of  GERD, asthma, comes in with cough and epigastric abd pain and diarrhea x 2 weeks. Pt was seen by outpt clinic where she was prescribed PPI. [facility]. Exam as noted above. Plan: Chest x-ray, basic labs, hepatic panel, start Pepcid and reassess.

## 2020-04-20 NOTE — ED PROVIDER NOTE - OBJECTIVE STATEMENT
55 y/o F PMHx RA, not on any DMARDs, allergy related asthma, vestibular migraines presents to ED with abdominal bloating and intermittent diarrhea x1 month and chest pressure and SOB x3 days. Denies fevers, vomiting, dysuria, bloody stools. SOB worse at night, not relieved by albuterol treatments at home. Pt also reports cough x2 weeks.

## 2020-04-20 NOTE — ED PROVIDER NOTE - CARE PROVIDER_API CALL
Santosh Kelsey)  Gastroenterology; Internal Medicine  [address] [place] [address]  Phone: [phone]  Fax: [phone]  Follow Up Time:

## 2020-04-20 NOTE — ED PROVIDER NOTE - NS ED ROS FT
Review of Systems:  CONSTITUTIONAL: No fever, No diaphoresis, No weight change  SKIN: No rash  HEMATOLOGIC: No abnormal bleeding or bruising  EYES: No eye pain, No blurred vision  ENT: No change in hearing, No sore throat, No neck pain, No rhinorrhea, No ear pain  RESPIRATORY: +shortness of breath, +cough  CARDIAC: No chest pain, No palpitations  GI: +abdominal pain, No nausea, No vomiting, No diarrhea, No constipation, No bright red blood per rectum or melena. No flank pain  : No dysuria, frequency, hematuria.   MUSCULOSKELETAL: No joint paint, No swelling, No back pain  NEUROLOGIC: No numbness, No focal weakness, No headache, No dizziness  All other systems negative, unless specified in HPI

## 2020-04-20 NOTE — ED PROVIDER NOTE - NSFOLLOWUPINSTRUCTIONS_ED_ALL_ED_FT
Abdominal Pain, Pediatric    Abdominal pain is one of the most common complaints in pediatrics. Many things can cause abdominal pain, and the causes change as your child grows. Usually, abdominal pain is not serious and will improve without treatment. It can often be observed and treated at home. Your child's health care provider will take a careful history and do a physical exam to help diagnose the cause of your child's pain. The health care provider may order blood tests and X-rays to help determine the cause or seriousness of your child's pain. However, in many cases, more time must pass before a clear cause of the pain can be found. Until then, your child's health care provider may not know if your child needs more testing or further treatment.    HOME CARE INSTRUCTIONS  Monitor your child's abdominal pain for any changes.  Give medicines only as directed by your child's health care provider.  Do not give your child laxatives unless directed to do so by the health care provider.  Try giving your child a clear liquid diet (broth, tea, or water) if directed by the health care provider. Slowly move to a bland diet as tolerated. Make sure to do this only as directed.  Have your child drink enough fluid to keep his or her urine clear or pale yellow.  Keep all follow-up visits as directed by your child's health care provider.    SEEK MEDICAL CARE IF:  Your child's abdominal pain changes.  Your child does not have an appetite or begins to lose weight.  Your child is constipated or has diarrhea that does not improve over 2–3 days.  Your child's pain seems to get worse with meals, after eating, or with certain foods.  Your child develops urinary problems like bedwetting or pain with urinating.  Pain wakes your child up at night.  Your child begins to miss school.  Your child's mood or behavior changes.  Your child who is older than 3 months has a fever.    SEEK IMMEDIATE MEDICAL CARE IF:  Your child's pain does not go away or the pain increases.  Your child's pain stays in one portion of the abdomen. Pain on the right side could be caused by appendicitis.  Your child's abdomen is swollen or bloated.  Your child who is younger than 3 months has a fever of 100°F (38°C) or higher.  Your child vomits repeatedly for 24 hours or vomits blood or green bile.  There is blood in your child's stool (it may be bright red, dark red, or black).  Your child is dizzy.  Your child pushes your hand away or screams when you touch his or her abdomen.  Your infant is extremely irritable.  Your child has weakness or is abnormally sleepy or sluggish (lethargic).  Your child develops new or severe problems.  Your child becomes dehydrated. Signs of dehydration include:  Extreme thirst.  Cold hands and feet.  Blotchy (mottled) or bluish discoloration of the hands, lower legs, and feet.  Not able to sweat in spite of heat.  Rapid breathing or pulse.  Confusion.  Feeling dizzy or feeling off-balance when standing.  Difficulty being awakened.  Minimal urine production.  No tears.    MAKE SURE YOU:  Understand these instructions.  Will watch your child's condition.  Will get help right away if your child is not doing well or gets worse.    ADDITIONAL NOTES AND INSTRUCTIONS    Please follow up with your Primary MD in 24-48 hr.  Seek immediate medical care for any new/worsening signs or symptoms. Abdominal Pain    Many things can cause abdominal pain. Usually, abdominal pain is not caused by a disease and will improve without treatment. Your health care provider will do a physical exam and possibly order blood tests and imaging to help determine the seriousness of your pain. However, in many cases, no cause may be found and you may need further testing as an outpatient. Monitor your abdominal pain for any changes.     SEEK IMMEDIATE MEDICAL CARE IF YOU HAVE THE FOLLOWING SYMPTOMS: worsening abdominal pain, vomiting, diarrhea, inability to have bowel movements or pass gas, black or bloody stool, fever accompanying chest pain or back pain, or dizziness/lightheadedness.

## 2020-04-20 NOTE — ED PROVIDER NOTE - PATIENT PORTAL LINK FT
You can access the FollowMyHealth Patient Portal offered by [facility] by registering at the following website: [email or web address]. By joining [employer]’s FollowMyHealth portal, you will also be able to view your health information using other applications (apps) compatible with our system.

## 2020-04-21 LAB
CULTURE RESULTS: SIGNIFICANT CHANGE UP
SPECIMEN SOURCE: SIGNIFICANT CHANGE UP

## 2020-09-30 NOTE — ED ADULT TRIAGE NOTE - NS ED TRIAGE EKG FT
Outpatient Occupational Therapy  INITIAL NEUROLOGICAL EVALUATION    [facility] Occupational Therapy [address]  [address]  [facility] [address]  Phone:  [phone]  Fax:  [phone]    Date of Evaluation: 2020    Patient: Rey Medina  YOB: 1996  MRN: [id number]     Referring Provider: Mandy Luke D.O.  [address] [facility] [place] [address]   Referring Diagnosis Nontraumatic intraventricular intracerebral hemorrhage, unspecified laterality (HCC) [I61.5]     Time Calculation    Start time: 0300  Stop time: 0400 Time Calculation (min): 60 minutes             Chief Complaint: Extremity Weakness (motor control), Other (cognition), and Self Care Duties    Visit Diagnoses     ICD-10-CM   1. 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  Hand dominance:  Right  Treatments:     Previous treatment:  Occupational therapy, speech therapy and physical therapy    Discharged from (in last 30 days): home health care      Treatment Comments:   OT 6/3//20-20  Activities of Daily Living:     Patient reported ADL status: Min assist feeding for full set-up of food on spoon, Mod assist grooming (assist with oral hygiene and shaving) with max cues, Min assist UB dressing, Max assist LB dressing, Mod assist bathing seated shower with max verbal/tactile cues.  Total assist toileting for bowel/bladder.  Enjoys video games.    Patient  "Goals:     Patient goals for therapy:  [place] with ADLs/IADLs and increased strength    Other patient goals:  To move better      Past Medical History:   Diagnosis Date   • Stroke (HCC)      Past Surgical History:   Procedure Laterality Date   • ANAL FISTULOTOMY       Social History     Tobacco Use   • Smoking status: Never Smoker   • Smokeless tobacco: Never Used   Substance Use Topics   • Alcohol use: Never     Frequency: Never     Family and Occupational History     Socioeconomic History   • Marital status: Single     Spouse name: Not on file   • Number of children: Not on file   • Years of education: Not on file   • Highest education level: Not on file   Occupational History   • Not on file       Objective:   Active Range of Motion:   Upper extremity (left):     All left upper extremity active range of motion: All within functional limits    AROM Left Shoulder Flexion: 160 degrees.  Upper extremity (right):     All right upper extremity active range of motion: All within functional limits    AROM Right Fingers Extension: right thumb with 15 degree reducible flexion contracture.      Passive Range of Motion:   Upper extremity (left):     All left upper extremity passive range of motion: All within functional limits  Upper extremity (right):     All right upper extremity passive range of motion: All within functional limits    Strength:     Left upper extremity strength within functional limits.      Right upper extremity strength within functional limits.      , Prehension, Pinch:  /Prehension (left):      strength: Impaired (35lbs)  /Prehension (right):      strength: Impaired (40lbs)    Tone, Sensation and Coordination:   Tone:     Left upper extremity muscle tone: Hypertonic    Right upper extremity muscle tone: Hypertonic    Modified Anusha:     Tone comments:   BUE with moderate dyskinesias \"jerky movements\" with purposeful activity.  Left wrist tendency for ulnar deviation, right " elbow hyperextension, bilateral thumbs in external rotation and IP flexion.      Sensation   Upper extremity (left):     Light touch: Intact  Upper extremity (right):     Light touch: Intact    Coordination   Upper extremity (left):     Fine motor: Impaired    Gross motor: Impaired    Finger to finger: Impaired    Finger touch to nose: Impaired  Upper extremity (right):     Fine motor: Impaired    Gross motor: Impaired    Finger to finger: Impaired    Finger touch to nose: Impaired    Cognition:     Orientation: normal to person (max cues for date)    Direction following: two step (motor)    Short term memory: impaired    Long term memory: impaired    Attention span: impaired    Sequencing: impaired    Organization: impaired    Problem solving: impaired    Judgement and safety awareness: impaired    Hearing: intact    Behavior: low initiation, responds to questions appropriately using 1 word answers    Cognition Comments:  Poverty of speech with dysarthria and 1 word answers  Able to follow 2 step motor commands (i.e. place tongue depressor inside tube)  Able to identify and select requested items out of 3 on table  Motor planning intact for basic familiar tasks (tie shoelaces: able to complete 1/3 steps)        Vision/Perception:     Visual tracking: impaired    Convergence: impaired    Visual attentions: impaired    Visual acuity: impaired    Visual scanning: impaired    Spatial relations: impaired    Vision assistive device(s): reading glasses    Vision/Perception Comments:   Smooth pursuits with moderate refixation saccades  Wears eyeglasses for reading and watching TV.    No reports of diploplia.      Exercises/Treatments  Time-based treatments/modalities:           Assessment, Response and Plan:   Impairments: abnormal ADL function, abnormal coordination, abnormal muscle firing, abnormal muscle tone, abnormal or restricted ROM, activity intolerance, fine motor function, impaired physical strength, lacks  appropriate home exercise program, limited ADL's, limited mobility and safety issue    Other Impairments:  Cognitive-communication deficits, visual-perceptual deficits  Assessment details:  Pt is a 24 y.o male s/p ICH secondary to AVM rupture on 4/21/2019 s/p AVM repair, hydrocephalus s/p  shunt who presents to outpatient OT functioning far below baseline secondary to severe cognitive-communication deficits, visual-perceptual deficits, abnormal movement patterns with hypertonicity, and mild weakness which are affecting his ability to perform his ADLs, mobility, recreational activities, and interact with his environment.    Barriers to therapy:  Cognition and communication  Prognosis: good    Goals:   Short Term Goals:   Pt will require mod assist to safely perform his oral hygiene with mod multi-modal cues and AE as needed.  Pt will require mod assist LB dressing with mod verbal/visual cues and chaining as needed  Pt will require max assist toileting routine with mod cues  Pt/family will be independent positioning to prevent secondary contractures  Pt will increase his bilateral  strength >= 5lbs to improve ability to grasp feeding utensils  Short term goal timespan:  2-4 weeks    Long Term Goals:   Pt will require supervision for UB dressing with min cues  Pt will require min assist bathing routine with mod verbal/visual cues and AE/compensatory techniques as needed  Pt will require mod assist toileting routine with mod cues  Pt will demonstrate ability to sustain his attention for 20 minutes to engage in functional activities with mod multi-modal cues  Pt/mother will be independent HEP for stretching and strengthening  OT long term goal timespan: 8-10 weeks.    Plan:   Therapy options:  Occupational therapy treatment to continue  Planned therapy interventions:  Therapeutic Activities (CPT 18457)  Frequency: 1-2 x week.  Duration in weeks:  10  Duration in visits:  10  Discussed with:  Patient and  family            Referring provider co-signature:  I have reviewed this plan of care and my co-signature certifies the need for services.    Certification Period: 09/30/2020 to  12/17/20    Physician Signature: ________________________________ Date: ______________         Dr. Winkler

## 2021-01-27 ENCOUNTER — APPOINTMENT (OUTPATIENT)
Dept: GASTROENTEROLOGY | Facility: CLINIC | Age: 56
End: 2021-01-27
Payer: COMMERCIAL

## 2021-01-27 DIAGNOSIS — M35.00 SICCA SYNDROME, UNSPECIFIED: ICD-10-CM

## 2021-01-27 DIAGNOSIS — M05.20 RHEUMATOID VASCULITIS WITH RHEUMATOID ARTHRITIS OF UNSPECIFIED SITE: ICD-10-CM

## 2021-01-27 DIAGNOSIS — Z87.19 PERSONAL HISTORY OF OTHER DISEASES OF THE DIGESTIVE SYSTEM: ICD-10-CM

## 2021-01-27 DIAGNOSIS — K58.9 IRRITABLE BOWEL SYNDROME W/OUT DIARRHEA: ICD-10-CM

## 2021-01-27 PROCEDURE — 99204 OFFICE O/P NEW MOD 45 MIN: CPT | Mod: 95

## 2021-01-27 RX ORDER — FAMOTIDINE 40 MG/1
40 TABLET, FILM COATED ORAL
Qty: 30 | Refills: 3 | Status: ACTIVE | COMMUNITY
Start: 2021-01-27 | End: 1900-01-01

## 2021-01-27 NOTE — HISTORY OF PRESENT ILLNESS
[Home] : at home, [unfilled] , at the time of the visit. [Medical Office: ([facility])___] : at the medical office located in  [Verbal consent obtained from patient] : the patient, [unfilled] [de-identified] : Patient is a 54 y/o female with PMHx of RA, Sjogrens, whom was recently seen at [facility] during Covid 19 scare. She had shoulder pain , upper abdominal pain, and was seen by Dr Kelsey. Patient was trying gluten free and ETOH free diet. She notes her symptoms of bloating has improved.

## 2021-01-27 NOTE — ASSESSMENT
[FreeTextEntry1] : Patient is a 56 y/o female with PMHx of RA, Sjogrens, whom was recently seen at [facility] during Covid 19 scare. She had shoulder pain , upper abdominal pain, and was seen by Dr Kelsey. Patient was trying gluten free and ETOH free diet. She notes her symptoms of bloating has improved. \par \par Left lower quadrant discomfort secondary to stool burden / SIBO\par - Mirlax daily\par - doing well with diet \par \par Downing Esophagus with dysplasia\par - Continue PPI therapy\par - Follow up EGD in one year

## 2021-02-10 ENCOUNTER — TRANSCRIPTION ENCOUNTER (OUTPATIENT)
Age: 56
End: 2021-02-10

## 2021-03-13 ENCOUNTER — EMERGENCY (EMERGENCY)
Facility: HOSPITAL | Age: 56
LOS: 0 days | Discharge: HOME | End: 2021-03-13
Attending: STUDENT IN AN ORGANIZED HEALTH CARE EDUCATION/TRAINING PROGRAM | Admitting: STUDENT IN AN ORGANIZED HEALTH CARE EDUCATION/TRAINING PROGRAM
Payer: COMMERCIAL

## 2021-03-13 VITALS
TEMPERATURE: 98 F | HEIGHT: 65 IN | SYSTOLIC BLOOD PRESSURE: 139 MMHG | OXYGEN SATURATION: 100 % | DIASTOLIC BLOOD PRESSURE: 89 MMHG | WEIGHT: 184.09 LBS | HEART RATE: 77 BPM | RESPIRATION RATE: 81 BRPM

## 2021-03-13 VITALS
HEART RATE: 72 BPM | RESPIRATION RATE: 18 BRPM | OXYGEN SATURATION: 99 % | DIASTOLIC BLOOD PRESSURE: 72 MMHG | SYSTOLIC BLOOD PRESSURE: 129 MMHG

## 2021-03-13 DIAGNOSIS — R51.9 HEADACHE, UNSPECIFIED: ICD-10-CM

## 2021-03-13 DIAGNOSIS — R42 DIZZINESS AND GIDDINESS: ICD-10-CM

## 2021-03-13 DIAGNOSIS — Z79.51 LONG TERM (CURRENT) USE OF INHALED STEROIDS: ICD-10-CM

## 2021-03-13 DIAGNOSIS — Z91.09 OTHER ALLERGY STATUS, OTHER THAN TO DRUGS AND BIOLOGICAL SUBSTANCES: ICD-10-CM

## 2021-03-13 DIAGNOSIS — J45.909 UNSPECIFIED ASTHMA, UNCOMPLICATED: ICD-10-CM

## 2021-03-13 LAB
ALBUMIN SERPL ELPH-MCNC: 4.3 G/DL — SIGNIFICANT CHANGE UP (ref 3.5–5.2)
ALP SERPL-CCNC: 69 U/L — SIGNIFICANT CHANGE UP (ref 30–115)
ALT FLD-CCNC: 25 U/L — SIGNIFICANT CHANGE UP (ref 0–41)
ANION GAP SERPL CALC-SCNC: 7 MMOL/L — SIGNIFICANT CHANGE UP (ref 7–14)
AST SERPL-CCNC: 22 U/L — SIGNIFICANT CHANGE UP (ref 0–41)
BASOPHILS # BLD AUTO: 0.06 K/UL — SIGNIFICANT CHANGE UP (ref 0–0.2)
BASOPHILS NFR BLD AUTO: 2 % — HIGH (ref 0–1)
BILIRUB SERPL-MCNC: 1.1 MG/DL — SIGNIFICANT CHANGE UP (ref 0.2–1.2)
BUN SERPL-MCNC: 12 MG/DL — SIGNIFICANT CHANGE UP (ref 10–20)
CALCIUM SERPL-MCNC: 9.2 MG/DL — SIGNIFICANT CHANGE UP (ref 8.5–10.1)
CHLORIDE SERPL-SCNC: 105 MMOL/L — SIGNIFICANT CHANGE UP (ref 98–110)
CO2 SERPL-SCNC: 27 MMOL/L — SIGNIFICANT CHANGE UP (ref 17–32)
CREAT SERPL-MCNC: 0.9 MG/DL — SIGNIFICANT CHANGE UP (ref 0.7–1.5)
EOSINOPHIL # BLD AUTO: 0.32 K/UL — SIGNIFICANT CHANGE UP (ref 0–0.7)
EOSINOPHIL NFR BLD AUTO: 10.5 % — HIGH (ref 0–8)
GLUCOSE SERPL-MCNC: 90 MG/DL — SIGNIFICANT CHANGE UP (ref 70–99)
HCT VFR BLD CALC: 37.8 % — SIGNIFICANT CHANGE UP (ref 37–47)
HGB BLD-MCNC: 12.4 G/DL — SIGNIFICANT CHANGE UP (ref 12–16)
IMM GRANULOCYTES NFR BLD AUTO: 0 % — LOW (ref 0.1–0.3)
LYMPHOCYTES # BLD AUTO: 1.37 K/UL — SIGNIFICANT CHANGE UP (ref 1.2–3.4)
LYMPHOCYTES # BLD AUTO: 45.1 % — SIGNIFICANT CHANGE UP (ref 20.5–51.1)
MAGNESIUM SERPL-MCNC: 2 MG/DL — SIGNIFICANT CHANGE UP (ref 1.8–2.4)
MCHC RBC-ENTMCNC: 26.8 PG — LOW (ref 27–31)
MCHC RBC-ENTMCNC: 32.8 G/DL — SIGNIFICANT CHANGE UP (ref 32–37)
MCV RBC AUTO: 81.6 FL — SIGNIFICANT CHANGE UP (ref 81–99)
MONOCYTES # BLD AUTO: 0.18 K/UL — SIGNIFICANT CHANGE UP (ref 0.1–0.6)
MONOCYTES NFR BLD AUTO: 5.9 % — SIGNIFICANT CHANGE UP (ref 1.7–9.3)
NEUTROPHILS # BLD AUTO: 1.11 K/UL — LOW (ref 1.4–6.5)
NEUTROPHILS NFR BLD AUTO: 36.5 % — LOW (ref 42.2–75.2)
NRBC # BLD: 0 /100 WBCS — SIGNIFICANT CHANGE UP (ref 0–0)
PLATELET # BLD AUTO: 249 K/UL — SIGNIFICANT CHANGE UP (ref 130–400)
POTASSIUM SERPL-MCNC: 4.2 MMOL/L — SIGNIFICANT CHANGE UP (ref 3.5–5)
POTASSIUM SERPL-SCNC: 4.2 MMOL/L — SIGNIFICANT CHANGE UP (ref 3.5–5)
PROT SERPL-MCNC: 7 G/DL — SIGNIFICANT CHANGE UP (ref 6–8)
RBC # BLD: 4.63 M/UL — SIGNIFICANT CHANGE UP (ref 4.2–5.4)
RBC # FLD: 15.3 % — HIGH (ref 11.5–14.5)
SODIUM SERPL-SCNC: 139 MMOL/L — SIGNIFICANT CHANGE UP (ref 135–146)
TROPONIN T SERPL-MCNC: <0.01 NG/ML — SIGNIFICANT CHANGE UP
WBC # BLD: 3.04 K/UL — LOW (ref 4.8–10.8)
WBC # FLD AUTO: 3.04 K/UL — LOW (ref 4.8–10.8)

## 2021-03-13 PROCEDURE — 93010 ELECTROCARDIOGRAM REPORT: CPT | Mod: NC

## 2021-03-13 PROCEDURE — 70450 CT HEAD/BRAIN W/O DYE: CPT | Mod: 26

## 2021-03-13 PROCEDURE — 99285 EMERGENCY DEPT VISIT HI MDM: CPT

## 2021-03-13 PROCEDURE — 71046 X-RAY EXAM CHEST 2 VIEWS: CPT | Mod: 26

## 2021-03-13 RX ORDER — ONDANSETRON 8 MG/1
4 TABLET, FILM COATED ORAL ONCE
Refills: 0 | Status: COMPLETED | OUTPATIENT
Start: 2021-03-13 | End: 2021-03-13

## 2021-03-13 RX ORDER — SODIUM CHLORIDE 9 MG/ML
1000 INJECTION INTRAMUSCULAR; INTRAVENOUS; SUBCUTANEOUS ONCE
Refills: 0 | Status: COMPLETED | OUTPATIENT
Start: 2021-03-13 | End: 2021-03-13

## 2021-03-13 RX ORDER — ACETAMINOPHEN 500 MG
650 TABLET ORAL ONCE
Refills: 0 | Status: COMPLETED | OUTPATIENT
Start: 2021-03-13 | End: 2021-03-13

## 2021-03-13 RX ADMIN — Medication 650 MILLIGRAM(S): at 20:11

## 2021-03-13 RX ADMIN — SODIUM CHLORIDE 1000 MILLILITER(S): 9 INJECTION INTRAMUSCULAR; INTRAVENOUS; SUBCUTANEOUS at 18:15

## 2021-03-13 RX ADMIN — ONDANSETRON 4 MILLIGRAM(S): 8 TABLET, FILM COATED ORAL at 20:55

## 2021-03-13 NOTE — ED PROVIDER NOTE - CLINICAL SUMMARY MEDICAL DECISION MAKING FREE TEXT BOX
55 year old female with a pmh of Rheumatoid Arthritis (not on immunomodulators), & vestibular headaches in 2004 presents here c/o episodes of lightheadedness and minor headaches x 5 weeks. Currently has no headaches. Patient has been seeing her PMD Dr. Rice, her cardiologist Dr. Rylee Bassett (has a scheduled tilt test), and her neurologist in [facility]. Patient had an episodes of floaters a few days ago but since resolved. However patient still has been having episodes of lightheadness and one episode of nausea today which prompted her to come to the ed. VS reviewed. Labs imaging ekg obtained and reviewed. CT head negative. Troponin negative EKG WNL. Patient a spoken to in detail about results  All questions addressed.  Results of ED work up discussed and patient given a copy of the results. Patient has proper follow up. Return precautions given.

## 2021-03-13 NOTE — ED PROVIDER NOTE - PATIENT PORTAL LINK FT
You can access the FollowMyHealth Patient Portal offered by [facility] by registering at the following website: [email or web address]. By joining [employer]’s FollowMyHealth portal, you will also be able to view your health information using other applications (apps) compatible with our system.

## 2021-03-13 NOTE — ED PROVIDER NOTE - ATTENDING CONTRIBUTION TO CARE
55 year old female with a pmh of Rheumatoid Arthritis (not on immunomodulators), & vestibular headaches in 2004 presents here c/o episodes of lightheadedness and minor headaches x 5 weeks. Currently has no headaches. Patient has been seeing her PMD Dr. Rice, her cardiologist Dr. Rylee Bassett (has a scheduled tilt test), and her neurologist in [facility]. Patient had an episodes of floaters a few days ago but since resolved. However patient still has been having episodes of lightheadness and one episode of nausea today which prompted her to come to the ed. No blurry vision no neck pain cp sob vomiting abdominal pain or urinary complaints.  On exam  CONSTITUTIONAL: WA / WN / NAD  HEAD: NCAT  EYES: PERRL; EOMI;   ENT: Normal pharynx; mucous membranes pink/moist, no erythema.  NECK: Supple; no meningeal signs  CARD: RRR; nl S1/S2; no M/R/G. Pulses equal bilaterally.  RESP: Respiratory rate and effort are normal; breath sounds clear and equal bilaterally.  ABD: Soft, NT ND   MSK/EXT: No gross deformities; full range of motion.  SKIN: Warm and dry;   NEURO: AAOx3, Motor 5/5 x 4 extremities, CN II-XII intact. Cerebellar testing normal No drift no dysmetria.  PSYCH: Memory Intact, Normal Affect

## 2021-03-13 NOTE — ED PROVIDER NOTE - OBJECTIVE STATEMENT
55 year old female with pmhx of vestibular migraines, and RA, presents with lightheadedness x 6 weeks. pt admits to mild headaches as well. Pt went to see her PMD, neurologist, and cardiologist 55 year old female with pmhx of vestibular migraines, and RA, presents with lightheadedness x 6 weeks. pt admits to mild headaches as well. Pt has been seeing her pmd, cardiologist, and neurologist. mild nausea. no v/d, fever, chills, chest pain, sob, loc, or head trauma.

## 2021-03-13 NOTE — ED ADULT NURSE NOTE - NSIMPLEMENTINTERV_GEN_ALL_ED
Implemented All Universal Safety Interventions:  [place] to call system. Call bell, personal items and telephone within reach. Instruct patient to call for assistance. Room bathroom lighting operational. Non-slip footwear when patient is off stretcher. Physically safe environment: no spills, clutter or unnecessary equipment. Stretcher in lowest position, wheels locked, appropriate side rails in place.

## 2021-03-13 NOTE — ED PROVIDER NOTE - NSFOLLOWUPINSTRUCTIONS_ED_ALL_ED_FT
Headache    A headache is pain or discomfort felt around the head or neck area. The specific cause of a headache may not be found as there are many types including tension headaches, migraine headaches, and cluster headaches. Watch your condition for any changes. Things you can do to manage your pain include taking over the counter and prescription medications as instructed by your health care provider, lying down in a dark quiet room, limiting stress, getting regular sleep, and refraining from alcohol and tobacco products.    SEEK IMMEDIATE MEDICAL CARE IF YOU EXPERIENCE THE FOLLOWING SYMPTOMS: fever, vomiting, stiff neck, loss of vision, problems with speech, muscle weakness, loss of balance, trouble walking, pass out, or confusion.    Follow up with your PMD, Neurologist, or Cardiologist.

## 2021-03-13 NOTE — ED PROVIDER NOTE - PHYSICAL EXAMINATION
CONST: Well appearing in NAD  EYES: PERRL, EOMI, Sclera and conjunctiva clear.   ENT: No nasal discharge. TM's clear Oropharynx normal appearing, no erythema or exudates. No abscess or swelling. Uvula midline. No temporal artery or mastoid tenderness.  NECK: Non-tender, no meningeal signs. normal ROM. supple   CARD: Normal S1 S2; Normal rate and rhythm  RESP: Equal BS B/L, No wheezes, rhonchi or rales. No distress  GI: Soft, non-tender, non-distended. no cva tenderness. normal BS  MS: Normal ROM in all extremities. No midline spinal tenderness. pulses 2 +. no calf tenderness or swelling  SKIN: Warm, dry, no acute rashes. Good turgor  NEURO: A&Ox3, No focal deficits. Strength 5/5 with no sensory deficits. Steady gait. Finger to nose intact. Negative pronator drift

## 2021-03-22 ENCOUNTER — APPOINTMENT (OUTPATIENT)
Dept: GASTROENTEROLOGY | Facility: CLINIC | Age: 56
End: 2021-03-22
Payer: COMMERCIAL

## 2021-03-22 PROCEDURE — 99443: CPT

## 2021-03-22 NOTE — ASSESSMENT
[FreeTextEntry1] : Patient is a 54 y/o female with PMHx of RA, Sjogrens, whom was recently seen at [facility] during Covid 19 scare. She had shoulder pain , upper abdominal pain, and was seen by Dr Kelsey. Patient was trying gluten free and ETOH free diet. She notes her symptoms of bloating has improved. She notes occasional reflux lately. \par \par Left lower quadrant discomfort secondary to stool burden / SIBO\par - Mirlax daily\par - doing well with diet \par \par Downing Esophagus with dysplasia\par - Continue PPI therapy\par - Will setup for non urgent EGD\par \par GI symptoms could be from Vestibular Neuritis

## 2021-03-22 NOTE — HISTORY OF PRESENT ILLNESS
[Home] : at home, [unfilled] , at the time of the visit. [Medical Office: ([facility])___] : at the medical office located in  [Verbal consent obtained from patient] : the patient, [unfilled] [FreeTextEntry4] : Ioana Zamorano [de-identified] : Patient is a 54 y/o female with PMHx of RA, Sjogrens, whom was recently seen at [facility] during Covid 19 scare. She had shoulder pain , upper abdominal pain, and was seen by Dr Kelsey. Patient was trying gluten free and ETOH free diet. She notes her symptoms of bloating has improved. She notes occasional reflux lately.

## 2021-04-08 ENCOUNTER — EMERGENCY (EMERGENCY)
Facility: HOSPITAL | Age: 56
LOS: 0 days | Discharge: HOME | End: 2021-04-09
Attending: EMERGENCY MEDICINE | Admitting: EMERGENCY MEDICINE
Payer: COMMERCIAL

## 2021-04-08 VITALS
TEMPERATURE: 98 F | HEART RATE: 66 BPM | OXYGEN SATURATION: 99 % | DIASTOLIC BLOOD PRESSURE: 82 MMHG | SYSTOLIC BLOOD PRESSURE: 131 MMHG | HEIGHT: 65 IN | RESPIRATION RATE: 14 BRPM | WEIGHT: 147.05 LBS

## 2021-04-08 DIAGNOSIS — E78.5 HYPERLIPIDEMIA, UNSPECIFIED: ICD-10-CM

## 2021-04-08 DIAGNOSIS — J45.909 UNSPECIFIED ASTHMA, UNCOMPLICATED: ICD-10-CM

## 2021-04-08 DIAGNOSIS — R07.89 OTHER CHEST PAIN: ICD-10-CM

## 2021-04-08 DIAGNOSIS — Z79.2 LONG TERM (CURRENT) USE OF ANTIBIOTICS: ICD-10-CM

## 2021-04-08 DIAGNOSIS — Z20.822 CONTACT WITH AND (SUSPECTED) EXPOSURE TO COVID-19: ICD-10-CM

## 2021-04-08 DIAGNOSIS — Z79.899 OTHER LONG TERM (CURRENT) DRUG THERAPY: ICD-10-CM

## 2021-04-08 DIAGNOSIS — Z91.018 ALLERGY TO OTHER FOODS: ICD-10-CM

## 2021-04-08 DIAGNOSIS — R11.0 NAUSEA: ICD-10-CM

## 2021-04-08 DIAGNOSIS — M06.9 RHEUMATOID ARTHRITIS, UNSPECIFIED: ICD-10-CM

## 2021-04-08 PROCEDURE — 99285 EMERGENCY DEPT VISIT HI MDM: CPT

## 2021-04-08 NOTE — ED ADULT TRIAGE NOTE - CHIEF COMPLAINT QUOTE
Intermittent midsternal chest pressure radiating to the back with tingling feeling. Denies dizziness, nausea, vomiting, sob, fever, and syncope.

## 2021-04-09 LAB
ALBUMIN SERPL ELPH-MCNC: 4.2 G/DL — SIGNIFICANT CHANGE UP (ref 3.5–5.2)
ALP SERPL-CCNC: 75 U/L — SIGNIFICANT CHANGE UP (ref 30–115)
ALT FLD-CCNC: 29 U/L — SIGNIFICANT CHANGE UP (ref 0–41)
ANION GAP SERPL CALC-SCNC: 7 MMOL/L — SIGNIFICANT CHANGE UP (ref 7–14)
AST SERPL-CCNC: 26 U/L — SIGNIFICANT CHANGE UP (ref 0–41)
BASOPHILS # BLD AUTO: 0.07 K/UL — SIGNIFICANT CHANGE UP (ref 0–0.2)
BASOPHILS NFR BLD AUTO: 1.6 % — HIGH (ref 0–1)
BILIRUB SERPL-MCNC: 0.9 MG/DL — SIGNIFICANT CHANGE UP (ref 0.2–1.2)
BUN SERPL-MCNC: 20 MG/DL — SIGNIFICANT CHANGE UP (ref 10–20)
CALCIUM SERPL-MCNC: 8.8 MG/DL — SIGNIFICANT CHANGE UP (ref 8.5–10.1)
CHLORIDE SERPL-SCNC: 103 MMOL/L — SIGNIFICANT CHANGE UP (ref 98–110)
CO2 SERPL-SCNC: 27 MMOL/L — SIGNIFICANT CHANGE UP (ref 17–32)
CREAT SERPL-MCNC: 1.1 MG/DL — SIGNIFICANT CHANGE UP (ref 0.7–1.5)
EOSINOPHIL # BLD AUTO: 0.31 K/UL — SIGNIFICANT CHANGE UP (ref 0–0.7)
EOSINOPHIL NFR BLD AUTO: 7.1 % — SIGNIFICANT CHANGE UP (ref 0–8)
GLUCOSE SERPL-MCNC: 95 MG/DL — SIGNIFICANT CHANGE UP (ref 70–99)
HCT VFR BLD CALC: 35.4 % — LOW (ref 37–47)
HGB BLD-MCNC: 11.3 G/DL — LOW (ref 12–16)
IMM GRANULOCYTES NFR BLD AUTO: 0 % — LOW (ref 0.1–0.3)
LYMPHOCYTES # BLD AUTO: 1.69 K/UL — SIGNIFICANT CHANGE UP (ref 1.2–3.4)
LYMPHOCYTES # BLD AUTO: 38.9 % — SIGNIFICANT CHANGE UP (ref 20.5–51.1)
MCHC RBC-ENTMCNC: 26.3 PG — LOW (ref 27–31)
MCHC RBC-ENTMCNC: 31.9 G/DL — LOW (ref 32–37)
MCV RBC AUTO: 82.5 FL — SIGNIFICANT CHANGE UP (ref 81–99)
MONOCYTES # BLD AUTO: 0.3 K/UL — SIGNIFICANT CHANGE UP (ref 0.1–0.6)
MONOCYTES NFR BLD AUTO: 6.9 % — SIGNIFICANT CHANGE UP (ref 1.7–9.3)
NEUTROPHILS # BLD AUTO: 1.97 K/UL — SIGNIFICANT CHANGE UP (ref 1.4–6.5)
NEUTROPHILS NFR BLD AUTO: 45.5 % — SIGNIFICANT CHANGE UP (ref 42.2–75.2)
NRBC # BLD: 0 /100 WBCS — SIGNIFICANT CHANGE UP (ref 0–0)
NT-PROBNP SERPL-SCNC: 44 PG/ML — SIGNIFICANT CHANGE UP (ref 0–300)
PLATELET # BLD AUTO: 245 K/UL — SIGNIFICANT CHANGE UP (ref 130–400)
POTASSIUM SERPL-MCNC: 4.2 MMOL/L — SIGNIFICANT CHANGE UP (ref 3.5–5)
POTASSIUM SERPL-SCNC: 4.2 MMOL/L — SIGNIFICANT CHANGE UP (ref 3.5–5)
PROT SERPL-MCNC: 6.7 G/DL — SIGNIFICANT CHANGE UP (ref 6–8)
RBC # BLD: 4.29 M/UL — SIGNIFICANT CHANGE UP (ref 4.2–5.4)
RBC # FLD: 15.8 % — HIGH (ref 11.5–14.5)
SARS-COV-2 RNA SPEC QL NAA+PROBE: SIGNIFICANT CHANGE UP
SODIUM SERPL-SCNC: 137 MMOL/L — SIGNIFICANT CHANGE UP (ref 135–146)
TROPONIN T SERPL-MCNC: <0.01 NG/ML — SIGNIFICANT CHANGE UP
TROPONIN T SERPL-MCNC: <0.01 NG/ML — SIGNIFICANT CHANGE UP
WBC # BLD: 4.34 K/UL — LOW (ref 4.8–10.8)
WBC # FLD AUTO: 4.34 K/UL — LOW (ref 4.8–10.8)

## 2021-04-09 PROCEDURE — 71045 X-RAY EXAM CHEST 1 VIEW: CPT | Mod: 26

## 2021-04-09 PROCEDURE — 93010 ELECTROCARDIOGRAM REPORT: CPT | Mod: 77

## 2021-04-09 PROCEDURE — 93010 ELECTROCARDIOGRAM REPORT: CPT

## 2021-04-09 NOTE — ED PROVIDER NOTE - PATIENT PORTAL LINK FT
You can access the FollowMyHealth Patient Portal offered by [facility] by registering at the following website: [email or web address]. By joining [employer]’s FollowMyHealth portal, you will also be able to view your health information using other applications (apps) compatible with our system.

## 2021-04-09 NOTE — ED PROVIDER NOTE - CARE PROVIDER_API CALL
Kareen Vivas  CARDIOVASCULAR DISEASE  [address] [facility] [address]  [place] [address]  Phone: [phone]  Fax: [phone]  Established Patient  Follow Up Time: 1-3 Days

## 2021-04-09 NOTE — ED PROVIDER NOTE - PHYSICAL EXAMINATION
Constitutional: Well developed, well nourished. NAD  Head: Normocephalic, atraumatic.  Eyes: PERRL, EOMI.  ENT: No nasal discharge. Mucous membranes dry.  Neck: Supple. Painless ROM.  Cardiovascular:  Regular rate and rhythm.    Pulmonary:  Lungs clear to auscultation bilaterally.    Abdominal: Soft. Nondistended. No rebound, guarding, rigidity.  Extremities. Pelvis stable. No lower extremity edema, symmetric calves.  Skin: No rashes, cyanosis.  Neuro: AAOx3. No focal neurological deficits.  Psych: Normal mood. Normal affect.

## 2021-04-09 NOTE — ED PROVIDER NOTE - CLINICAL SUMMARY MEDICAL DECISION MAKING FREE TEXT BOX
pt evaluated for chest discomfort, trop negative x 2, CXR NAPD, pt with normal CCTA 2019. labs otherwise unremarkable, pt reported feeling well to go home. advised close follow up with Dr. Vivas and pt agreed. Strict return precautions advised and pt verbalized understanding.

## 2021-04-09 NOTE — ED PROVIDER NOTE - NSFOLLOWUPINSTRUCTIONS_ED_ALL_ED_FT

## 2021-04-09 NOTE — ED ADULT NURSE NOTE - OBJECTIVE STATEMENT
pt c/o midsternal chest pressure, pt states she "feels like someones sitting on her chest". pt states it does not radiate. pressure started around noon after she played tennis.

## 2021-04-09 NOTE — ED PROVIDER NOTE - OBJECTIVE STATEMENT
55 yold female to Ed Pmhx Ra, Asthma, Hld, Vertigo c/o Left side substernal chest pain described as pressure started this afternoon intermittently x few hours with radiation to left back area; pt with nausea; pt denies sob, diaphoresis; pt had ccta 2019 negative; cardiology Dr. Vivas - pt physically active; - played tennis this am without sx; completed covid vaccination 2 weeks ago;  pt deneis smoking, fmhx cad <55;

## 2021-04-09 NOTE — ED PROVIDER NOTE - ATTENDING CONTRIBUTION TO CARE
56 yo female with PMH asthma, RA, HLD, vertigo, nonsmoker presented c/o left sided chest pressure on and off x several hours with radiation to back. Pt denied any exertional sx, reported being very active and playing tennis regularly. + nausea without any V/D, abdominal pain, SOB, palpitations, HA, or dizziness. Pt had normal CCTA 2019. Cardiologist is Dr. Vivas.    VITAL SIGNS: noted  CONSTITUTIONAL: Well-developed; well-nourished; in no acute distress  HEAD: Normocephalic; atraumatic  EYES: PERRL, EOM intact; conjunctiva and sclera clear  ENT: No nasal discharge; airway clear. MMM  NECK: Supple; non tender. No anterior cervical lymphadenopathy noted  CARD: S1, S2 normal; no murmurs, gallops, or rubs. Regular rate and rhythm  RESP: CTAB/L, no wheezes, rales or rhonchi  ABD: Normal bowel sounds; soft; non-distended; non-tender; no hepatosplenomegaly. No CVA tenderness  EXT: Normal ROM. No calf tenderness or edema. Distal pulses intact  NEURO: Alert, oriented. Grossly unremarkable. No focal deficits  SKIN: Skin exam is warm and dry, no acute rash  MS: No midline spinal tenderness

## 2021-08-06 ENCOUNTER — OUTPATIENT (OUTPATIENT)
Dept: OUTPATIENT SERVICES | Facility: HOSPITAL | Age: 56
LOS: 1 days | Discharge: HOME | End: 2021-08-06

## 2021-08-06 ENCOUNTER — LABORATORY RESULT (OUTPATIENT)
Age: 56
End: 2021-08-06

## 2021-08-06 DIAGNOSIS — Z11.59 ENCOUNTER FOR SCREENING FOR OTHER VIRAL DISEASES: ICD-10-CM

## 2021-08-09 ENCOUNTER — RESULT REVIEW (OUTPATIENT)
Age: 56
End: 2021-08-09

## 2021-08-09 ENCOUNTER — TRANSCRIPTION ENCOUNTER (OUTPATIENT)
Age: 56
End: 2021-08-09

## 2021-08-09 ENCOUNTER — OUTPATIENT (OUTPATIENT)
Dept: OUTPATIENT SERVICES | Facility: HOSPITAL | Age: 56
LOS: 1 days | Discharge: HOME | End: 2021-08-09
Payer: COMMERCIAL

## 2021-08-09 VITALS — HEART RATE: 74 BPM | SYSTOLIC BLOOD PRESSURE: 135 MMHG | DIASTOLIC BLOOD PRESSURE: 73 MMHG | RESPIRATION RATE: 18 BRPM

## 2021-08-09 VITALS
SYSTOLIC BLOOD PRESSURE: 136 MMHG | DIASTOLIC BLOOD PRESSURE: 88 MMHG | TEMPERATURE: 99 F | RESPIRATION RATE: 18 BRPM | HEART RATE: 55 BPM

## 2021-08-09 DIAGNOSIS — Z98.890 OTHER SPECIFIED POSTPROCEDURAL STATES: Chronic | ICD-10-CM

## 2021-08-09 PROCEDURE — 88305 TISSUE EXAM BY PATHOLOGIST: CPT | Mod: 26

## 2021-08-09 PROCEDURE — 43239 EGD BIOPSY SINGLE/MULTIPLE: CPT | Mod: XU

## 2021-08-09 PROCEDURE — 88312 SPECIAL STAINS GROUP 1: CPT | Mod: 26

## 2021-08-09 PROCEDURE — 43236 UPPR GI SCOPE W/SUBMUC INJ: CPT | Mod: XU

## 2021-08-09 PROCEDURE — 43251 EGD REMOVE LESION SNARE: CPT

## 2021-08-09 RX ORDER — CEFUROXIME AXETIL 250 MG
0 TABLET ORAL
Qty: 0 | Refills: 0 | DISCHARGE

## 2021-08-09 RX ORDER — CIPROFLOXACIN LACTATE 400MG/40ML
0 VIAL (ML) INTRAVENOUS
Qty: 0 | Refills: 0 | DISCHARGE

## 2021-08-09 NOTE — CHART NOTE - NSCHARTNOTEFT_GEN_A_CORE
PACU ANESTHESIA ADMISSION NOTE      Procedure:   Post op diagnosis:      ____  Intubated  TV:______       Rate: ______      FiO2: ______    __x__  Patent Airway    __x__  Full return of protective reflexes    __x__  Full recovery from anesthesia / back to baseline     Vitals:   T:    97.6       R: 18                 BP: 124/99                 Sat:   99%                P: 101      Mental Status:  __x__ Awake   __x___ Alert   _____ Drowsy   _____ Sedated    Nausea/Vomiting:  __x__ NO  ______Yes,   See Post - Op Orders          Pain Scale (0-10):  __0___    Treatment: ____ None    ___x_ See Post - Op/PCA Orders    Post - Operative Fluids:   ____ Oral   __x__ See Post - Op Orders    Plan: Discharge:   __x__Home       _____Floor     _____Critical Care    _____  Other:_________________    Comments:  pt tolerated procedure well, pt transferred to PACU and report was given to PACU RN, vital signs are stable and pt shows no signs of distress. no anesthesia complications, discharge pt when criteria met.

## 2021-08-09 NOTE — ASU PATIENT PROFILE, ADULT - PMH
Asthma    Back problem    High cholesterol    Rheumatoid arthritis    Vertigo    Vestibulopathy     Asthma    Back problem    High cholesterol    Migraine  vestibular  Rheumatoid arthritis    Vertigo

## 2021-08-11 LAB — SURGICAL PATHOLOGY STUDY: SIGNIFICANT CHANGE UP

## 2021-08-13 DIAGNOSIS — K20.90 ESOPHAGITIS, UNSPECIFIED WITHOUT BLEEDING: ICD-10-CM

## 2021-08-13 DIAGNOSIS — K31.9 DISEASE OF STOMACH AND DUODENUM, UNSPECIFIED: ICD-10-CM

## 2021-08-13 DIAGNOSIS — D13.2 BENIGN NEOPLASM OF DUODENUM: ICD-10-CM

## 2021-08-13 DIAGNOSIS — K22.70 BARRETT'S ESOPHAGUS WITHOUT DYSPLASIA: ICD-10-CM

## 2021-08-13 DIAGNOSIS — J45.909 UNSPECIFIED ASTHMA, UNCOMPLICATED: ICD-10-CM

## 2021-08-13 DIAGNOSIS — K44.9 DIAPHRAGMATIC HERNIA WITHOUT OBSTRUCTION OR GANGRENE: ICD-10-CM

## 2021-08-13 DIAGNOSIS — E78.00 PURE HYPERCHOLESTEROLEMIA, UNSPECIFIED: ICD-10-CM

## 2021-09-01 ENCOUNTER — APPOINTMENT (OUTPATIENT)
Dept: GASTROENTEROLOGY | Facility: CLINIC | Age: 56
End: 2021-09-01
Payer: COMMERCIAL

## 2021-09-01 PROCEDURE — 99442: CPT

## 2021-09-01 NOTE — ASSESSMENT
[FreeTextEntry1] : Patient is a 57 y/o female with PMHx of RA, Sjogrens, whom was recently seen at [facility] during Covid 19 scare. She had shoulder pain , upper abdominal pain, and was seen by Dr Kelsey. Patient was trying gluten free and ETOH free diet. She notes her symptoms of bloating has improved. She had EGD with EMR of a Duodenal polyp ( TA without Dysplasia), she also had IM noted in the antrum. Dsicussed with patient follow up advised in one year\par \par Gastric IM/ Duodenal polyp TA without Dysplasia\par - Follow up in 1 year\par - Procedure discussed with pathology in detail with patient

## 2021-09-01 NOTE — HISTORY OF PRESENT ILLNESS
[Home] : at home, [unfilled] , at the time of the visit. [Medical Office: ([facility])___] : at the medical office located in  [Verbal consent obtained from patient] : the patient, [unfilled] [_________] : Performed [unfilled] [de-identified] : Patient is a 55 y/o female with PMHx of RA, Sjogrens, whom was recently seen at [facility] during Covid 19 scare. She had shoulder pain , upper abdominal pain, and was seen by Dr Kelsey. Patient was trying gluten free and ETOH free diet. She notes her symptoms of bloating has improved. She had EGD with EMR of a Duodenal polyp ( TA without Dysplasia), she also had IM noted in the antrum.

## 2021-09-04 PROBLEM — G43.909 MIGRAINE, UNSPECIFIED, NOT INTRACTABLE, WITHOUT STATUS MIGRAINOSUS: Chronic | Status: ACTIVE | Noted: 2021-08-09

## 2021-11-19 ENCOUNTER — EMERGENCY (EMERGENCY)
Facility: HOSPITAL | Age: 56
LOS: 0 days | Discharge: HOME | End: 2021-11-20
Attending: EMERGENCY MEDICINE | Admitting: EMERGENCY MEDICINE
Payer: COMMERCIAL

## 2021-11-19 VITALS
HEART RATE: 71 BPM | SYSTOLIC BLOOD PRESSURE: 157 MMHG | OXYGEN SATURATION: 100 % | HEIGHT: 65 IN | TEMPERATURE: 100 F | RESPIRATION RATE: 17 BRPM | DIASTOLIC BLOOD PRESSURE: 84 MMHG

## 2021-11-19 DIAGNOSIS — M06.9 RHEUMATOID ARTHRITIS, UNSPECIFIED: ICD-10-CM

## 2021-11-19 DIAGNOSIS — K21.9 GASTRO-ESOPHAGEAL REFLUX DISEASE WITHOUT ESOPHAGITIS: ICD-10-CM

## 2021-11-19 DIAGNOSIS — Z20.822 CONTACT WITH AND (SUSPECTED) EXPOSURE TO COVID-19: ICD-10-CM

## 2021-11-19 DIAGNOSIS — R07.9 CHEST PAIN, UNSPECIFIED: ICD-10-CM

## 2021-11-19 DIAGNOSIS — R10.13 EPIGASTRIC PAIN: ICD-10-CM

## 2021-11-19 DIAGNOSIS — I25.10 ATHEROSCLEROTIC HEART DISEASE OF NATIVE CORONARY ARTERY WITHOUT ANGINA PECTORIS: ICD-10-CM

## 2021-11-19 DIAGNOSIS — E78.00 PURE HYPERCHOLESTEROLEMIA, UNSPECIFIED: ICD-10-CM

## 2021-11-19 DIAGNOSIS — Z98.890 OTHER SPECIFIED POSTPROCEDURAL STATES: Chronic | ICD-10-CM

## 2021-11-19 DIAGNOSIS — J45.909 UNSPECIFIED ASTHMA, UNCOMPLICATED: ICD-10-CM

## 2021-11-19 PROCEDURE — 93010 ELECTROCARDIOGRAM REPORT: CPT

## 2021-11-19 RX ORDER — KETOROLAC TROMETHAMINE 30 MG/ML
15 SYRINGE (ML) INJECTION ONCE
Refills: 0 | Status: DISCONTINUED | OUTPATIENT
Start: 2021-11-19 | End: 2021-11-19

## 2021-11-19 RX ORDER — ASPIRIN/CALCIUM CARB/MAGNESIUM 324 MG
162 TABLET ORAL ONCE
Refills: 0 | Status: COMPLETED | OUTPATIENT
Start: 2021-11-19 | End: 2021-11-19

## 2021-11-19 RX ADMIN — Medication 162 MILLIGRAM(S): at 23:26

## 2021-11-19 RX ADMIN — Medication 15 MILLIGRAM(S): at 23:26

## 2021-11-19 RX ADMIN — Medication 30 MILLILITER(S): at 23:26

## 2021-11-19 NOTE — ED ADULT TRIAGE NOTE - CHIEF COMPLAINT QUOTE
Pt complaining of epigastric pain x 1 day. hx of acid reflex.  Pt had booster shot yesterday. EKG done in triage

## 2021-11-20 VITALS
DIASTOLIC BLOOD PRESSURE: 89 MMHG | RESPIRATION RATE: 18 BRPM | HEART RATE: 85 BPM | SYSTOLIC BLOOD PRESSURE: 133 MMHG | OXYGEN SATURATION: 99 %

## 2021-11-20 LAB
ALBUMIN SERPL ELPH-MCNC: 4 G/DL — SIGNIFICANT CHANGE UP (ref 3.5–5.2)
ALP SERPL-CCNC: 86 U/L — SIGNIFICANT CHANGE UP (ref 30–115)
ALT FLD-CCNC: 19 U/L — SIGNIFICANT CHANGE UP (ref 0–41)
ANION GAP SERPL CALC-SCNC: 13 MMOL/L — SIGNIFICANT CHANGE UP (ref 7–14)
AST SERPL-CCNC: 29 U/L — SIGNIFICANT CHANGE UP (ref 0–41)
BASOPHILS # BLD AUTO: 0.03 K/UL — SIGNIFICANT CHANGE UP (ref 0–0.2)
BASOPHILS NFR BLD AUTO: 0.7 % — SIGNIFICANT CHANGE UP (ref 0–1)
BILIRUB SERPL-MCNC: 0.9 MG/DL — SIGNIFICANT CHANGE UP (ref 0.2–1.2)
BUN SERPL-MCNC: 12 MG/DL — SIGNIFICANT CHANGE UP (ref 10–20)
CALCIUM SERPL-MCNC: 9 MG/DL — SIGNIFICANT CHANGE UP (ref 8.5–10.1)
CHLORIDE SERPL-SCNC: 103 MMOL/L — SIGNIFICANT CHANGE UP (ref 98–110)
CO2 SERPL-SCNC: 20 MMOL/L — SIGNIFICANT CHANGE UP (ref 17–32)
CREAT SERPL-MCNC: 0.7 MG/DL — SIGNIFICANT CHANGE UP (ref 0.7–1.5)
D DIMER BLD IA.RAPID-MCNC: 250 NG/ML DDU — HIGH (ref 0–230)
EOSINOPHIL # BLD AUTO: 0.26 K/UL — SIGNIFICANT CHANGE UP (ref 0–0.7)
EOSINOPHIL NFR BLD AUTO: 5.9 % — SIGNIFICANT CHANGE UP (ref 0–8)
GLUCOSE SERPL-MCNC: 94 MG/DL — SIGNIFICANT CHANGE UP (ref 70–99)
HCT VFR BLD CALC: 37 % — SIGNIFICANT CHANGE UP (ref 37–47)
HGB BLD-MCNC: 12.2 G/DL — SIGNIFICANT CHANGE UP (ref 12–16)
IMM GRANULOCYTES NFR BLD AUTO: 0.2 % — SIGNIFICANT CHANGE UP (ref 0.1–0.3)
LIDOCAIN IGE QN: 29 U/L — SIGNIFICANT CHANGE UP (ref 7–60)
LYMPHOCYTES # BLD AUTO: 0.81 K/UL — LOW (ref 1.2–3.4)
LYMPHOCYTES # BLD AUTO: 18.3 % — LOW (ref 20.5–51.1)
MCHC RBC-ENTMCNC: 27.7 PG — SIGNIFICANT CHANGE UP (ref 27–31)
MCHC RBC-ENTMCNC: 33 G/DL — SIGNIFICANT CHANGE UP (ref 32–37)
MCV RBC AUTO: 84.1 FL — SIGNIFICANT CHANGE UP (ref 81–99)
MONOCYTES # BLD AUTO: 0.39 K/UL — SIGNIFICANT CHANGE UP (ref 0.1–0.6)
MONOCYTES NFR BLD AUTO: 8.8 % — SIGNIFICANT CHANGE UP (ref 1.7–9.3)
NEUTROPHILS # BLD AUTO: 2.93 K/UL — SIGNIFICANT CHANGE UP (ref 1.4–6.5)
NEUTROPHILS NFR BLD AUTO: 66.1 % — SIGNIFICANT CHANGE UP (ref 42.2–75.2)
NRBC # BLD: 0 /100 WBCS — SIGNIFICANT CHANGE UP (ref 0–0)
PLATELET # BLD AUTO: 229 K/UL — SIGNIFICANT CHANGE UP (ref 130–400)
POTASSIUM SERPL-MCNC: 4.6 MMOL/L — SIGNIFICANT CHANGE UP (ref 3.5–5)
POTASSIUM SERPL-SCNC: 4.6 MMOL/L — SIGNIFICANT CHANGE UP (ref 3.5–5)
PROT SERPL-MCNC: 6.9 G/DL — SIGNIFICANT CHANGE UP (ref 6–8)
RBC # BLD: 4.4 M/UL — SIGNIFICANT CHANGE UP (ref 4.2–5.4)
RBC # FLD: 14.8 % — HIGH (ref 11.5–14.5)
SARS-COV-2 RNA SPEC QL NAA+PROBE: SIGNIFICANT CHANGE UP
SODIUM SERPL-SCNC: 136 MMOL/L — SIGNIFICANT CHANGE UP (ref 135–146)
TROPONIN T SERPL-MCNC: <0.01 NG/ML — SIGNIFICANT CHANGE UP
TROPONIN T SERPL-MCNC: <0.01 NG/ML — SIGNIFICANT CHANGE UP
WBC # BLD: 4.43 K/UL — LOW (ref 4.8–10.8)
WBC # FLD AUTO: 4.43 K/UL — LOW (ref 4.8–10.8)

## 2021-11-20 PROCEDURE — 93010 ELECTROCARDIOGRAM REPORT: CPT

## 2021-11-20 PROCEDURE — 71046 X-RAY EXAM CHEST 2 VIEWS: CPT | Mod: 26

## 2021-11-20 PROCEDURE — 71275 CT ANGIOGRAPHY CHEST: CPT | Mod: 26,MA

## 2021-11-20 PROCEDURE — 99220: CPT

## 2021-11-20 NOTE — ED CDU PROVIDER DISPOSITION NOTE - ATTENDING CONTRIBUTION TO CARE
55yo woman h/o GERD/Barretts esophagus, RA, HLD was placed in CDU for workup of epigastric/substernal chest discomfort that began after she got her COVID 19 booster shot. ED workup was unremarkable. VS, exam as noted, pt nontoxic appearing on my eval. Initial plan is for telemetry, serial EKG/enzymes, CCTA--however review of chart reveals normal CCTA in 2019 with CAD-RAD 0. Sx resolved with pepcid. Pt monitored without incident and repeat EKG and enzymes ok. Clinical picture suggestive of GI etiology, advised OTC remedies and f/u GI. Pt comfortable with discharge.

## 2021-11-20 NOTE — ED CDU PROVIDER DISPOSITION NOTE - CLINICAL COURSE
57yo woman h/o GERD/Barretts esophagus, RA, HLD was placed in CDU for workup of epigastric/substernal chest discomfort that began after she got her COVID 19 booster shot. ED workup was unremarkable. VS, exam as noted, pt nontoxic appearing on my eval. Initial plan is for telemetry, serial EKG/enzymes, CCTA--however review of chart reveals normal CCTA in 2019 with CAD-RAD 0. Sx resolved with pepcid. Pt monitored without incident and repeat EKG and enzymes ok. Clinical picture suggestive of GI etiology, advised OTC remedies and f/u GI. Pt comfortable with discharge.

## 2021-11-20 NOTE — ED CDU PROVIDER INITIAL DAY NOTE - MEDICAL DECISION MAKING DETAILS
57yo woman h/o GERD/Barretts esophagus, RA, HLD was placed in CDU for workup of epigastric/substernal chest discomfort that began after she got her COVID 19 booster shot. ED workup was unremarkable. VS, exam as noted, pt nontoxic appearing on my eval. Initial plan is for telemetry, serial EKG/enzymes, CCTA--however review of chart reveals normal CCTA in 2019 with CAD-RAD 0. Sx resolved with pepcid. Pt monitored without incident and repeat EKG and enzymes ok. Clinical picture suggestive of GI etiology, advised OTC remedies and f/u GI.

## 2021-11-20 NOTE — ED ADULT NURSE REASSESSMENT NOTE - GENERAL PATIENT STATE
report taken from KIARA Wetzel. Pt received aaox3, ambulatory without assistance with stead gait. Respirations even and unlabored/comfortable appearance/cooperative

## 2021-11-20 NOTE — ED CDU PROVIDER INITIAL DAY NOTE - PROGRESS NOTE DETAILS
pt asx at this time.  ccta 2019 cad rad 0.  epigastric pain resolved. lipase negative.  abd exam negative. Discussed results with pt.  All questions were answered and return precautions discussed.  Pt is asx and comfortable at this time.  Unremarkable re-exam.  No further concerns at this time from pt.  Will follow up with PMD and GI.  Pt understands and agrees with tx plan.

## 2021-11-20 NOTE — ED CDU PROVIDER DISPOSITION NOTE - CARE PROVIDER_API CALL
Samm Bhat)  Gastroenterology; Internal Medicine  [address] [place] [address]  Phone: [phone]  Fax: [phone]  Follow Up Time: 1-3 Days    Mariel Worthy)  Surgery  [address] [place] [address], [address] [place] [address]  Phone: [phone]  Fax: [phone]  Follow Up Time: 1-3 Days   Samm Bhat)  Gastroenterology; Internal Medicine  [address] [place] [address]  Phone: [phone]  Fax: [phone]  Follow Up Time: 1-3 Days    Mariel Worthy)  Surgery  [address] [place] [address], [address] [place] [address]  Phone: [phone]  Fax: [phone]  Follow Up Time: 1-3 Days    Sandy Donis)  Gastroenterology  [address] [place] [address]  Phone: [phone]  Fax: [phone]  Follow Up Time: 1-3 Days

## 2021-11-20 NOTE — ED PROVIDER NOTE - CLINICAL SUMMARY MEDICAL DECISION MAKING FREE TEXT BOX
Pt presents with chest and epigastric pain. Required ekg, labs, meds and imaging. Plan is placement in EDOU for further management.

## 2021-11-20 NOTE — ED CDU PROVIDER DISPOSITION NOTE - PROVIDER TOKENS
PROVIDER:[TOKEN:[[id number]],FOLLOWUP:[1-3 Days]],PROVIDER:[TOKEN:[[id number]],FOLLOWUP:[1-3 Days]] PROVIDER:[TOKEN:[[id number]],FOLLOWUP:[1-3 Days]],PROVIDER:[TOKEN:[[id number]],FOLLOWUP:[1-3 Days]],PROVIDER:[TOKEN:[[id number]],FOLLOWUP:[1-3 Days]]

## 2021-11-20 NOTE — ED PROVIDER NOTE - ATTENDING CONTRIBUTION TO CARE
I personally evaluated the patient. I reviewed the Resident’s or Physician Assistant’s note (as assigned above), and agree with the findings and plan except as documented in my note.  56 F with hx of GERD and Juno's esophagus with complaints of several hours of mid chest pain and mid epigastric pain. Also reports she noted exertional SOB. No LE pain or swelling, non smoker. VS reviewed, pt non-toxic appearing, NAD. Head ncat, MMM, neck supple, normal ROM, no JVD, normal s1s2 without any murmurs, no reproducible chest wall ttp, Lungs CTAB with normal work of breathing. abd +BS, s/nd/nt, extremities wnl, neuro exam grossly normal. No acute skin rashes. Plan is ekg, labs, imaging, meds as needed and dispo accordingly.

## 2021-11-20 NOTE — ED PROVIDER NOTE - OBJECTIVE STATEMENT
57 yo female hx of GERD/Juno esophagus/RA/ HLD present c/o chest pain/epigastric pain for few hours. pain is pressure like. took pepcid without improvement. also reported she took her 3rd dose booster covid vaccine yesterday so she has mild myalgia. denies SOB/weakness/diaphoresis/nausea associate with chest pain.   Denies exogenous hormone use/recent hospitalization/hx of DVT. denies recent illness/fever/chill/HA/dizziness/sob/abd pain/n/v/d/urinary sxs.  reported she had stress test few years ago and it was normal.

## 2021-11-20 NOTE — ED PROVIDER NOTE - NSICDXPASTMEDICALHX_GEN_ALL_CORE_FT
PAST MEDICAL HISTORY:  Asthma     Back problem     High cholesterol     Migraine vestibular    Rheumatoid arthritis     Vertigo

## 2021-11-20 NOTE — ED CDU PROVIDER DISPOSITION NOTE - NSFOLLOWUPINSTRUCTIONS_ED_ALL_ED_FT
Abdominal Pain    Many things can cause abdominal pain. Many times, abdominal pain is not caused by a disease and will improve without treatment. Your health care provider will do a physical exam to determine if there is a dangerous cause of your pain; blood tests and imaging may help determine the cause of your pain. However, in many cases, no cause may be found and you may need further testing as an outpatient. Monitor your abdominal pain for any changes.     SEEK IMMEDIATE MEDICAL CARE IF YOU HAVE ANY OF THE FOLLOWING SYMPTOMS: worsening abdominal pain, uncontrollable vomiting, profuse diarrhea, inability to have bowel movements or pass gas, black or bloody stools, fever accompanying chest pain or back pain, or fainting. These symptoms may represent a serious problem that is an emergency. Do not wait to see if the symptoms will go away. Get medical help right away. Call 911 and do not drive yourself to the hospital.    Follow up with your primary medical doctor in 1-2 days    Chest Pain    Chest pain can be caused by many different conditions which may or may not be dangerous. Causes include heartburn, lung infections, heart attack, blood clot in lungs, skin infections, strain or damage to muscle, cartilage, or bones, etc. In addition to a history and physical examination, an electrocardiogram (ECG) or other lab tests may have been performed to determine the cause of your chest pain. Follow up with your primary care provider or with a cardiologist as instructed.     SEEK IMMEDIATE MEDICAL CARE IF YOU HAVE ANY OF THE FOLLOWING SYMPTOMS: worsening chest pain, coughing up blood, unexplained back/neck/jaw pain, severe abdominal pain, dizziness or lightheadedness, fainting, shortness of breath, sweaty or clammy skin, vomiting, or racing heart beat. These symptoms may represent a serious problem that is an emergency. Do not wait to see if the symptoms will go away. Get medical help right away. Call 911 and do not drive yourself to the hospital.

## 2021-11-20 NOTE — ED CDU PROVIDER DISPOSITION NOTE - CARE PROVIDERS DIRECT ADDRESSES
,[email or web address]

## 2021-11-20 NOTE — ED CDU PROVIDER INITIAL DAY NOTE - ATTENDING CONTRIBUTION TO CARE
55yo woman h/o GERD/Barretts esophagus, RA, HLD was placed in CDU for workup of epigastric/substernal chest discomfort that began after she got her COVID 19 booster shot. ED workup was unremarkable. VS, exam as noted, pt nontoxic appearing on my eval. Initial plan is for telemetry, serial EKG/enzymes, CCTA--however review of chart reveals normal CCTA in 2019 with CAD-RAD 0. Sx resolved with pepcid. Pt monitored without incident and repeat EKG and enzymes ok. Clinical picture suggestive of GI etiology, advised OTC remedies and f/u GI.

## 2021-11-20 NOTE — ED CDU PROVIDER DISPOSITION NOTE - PATIENT PORTAL LINK FT
You can access the FollowMyHealth Patient Portal offered by [facility] by registering at the following website: [email or web address]. By joining [facility]’s FollowMyHealth portal, you will also be able to view your health information using other applications (apps) compatible with our system.

## 2021-12-01 ENCOUNTER — APPOINTMENT (OUTPATIENT)
Dept: GASTROENTEROLOGY | Facility: CLINIC | Age: 56
End: 2021-12-01
Payer: COMMERCIAL

## 2021-12-01 DIAGNOSIS — R10.9 UNSPECIFIED ABDOMINAL PAIN: ICD-10-CM

## 2021-12-01 PROCEDURE — 99443: CPT

## 2021-12-01 RX ORDER — ESOMEPRAZOLE MAGNESIUM 40 MG/1
40 CAPSULE, DELAYED RELEASE ORAL
Qty: 30 | Refills: 3 | Status: DISCONTINUED | COMMUNITY
Start: 2021-03-22 | End: 2021-12-01

## 2021-12-01 RX ORDER — PANTOPRAZOLE 40 MG/1
40 TABLET, DELAYED RELEASE ORAL
Qty: 60 | Refills: 0 | Status: DISCONTINUED | COMMUNITY
Start: 2021-08-09 | End: 2021-12-01

## 2021-12-01 NOTE — ASSESSMENT
[FreeTextEntry1] : Patient is a 57 y/o female with PMHx of RA, Sjogrens, whom was recently seen at [facility] during Covid 19 scare. She had shoulder pain , upper abdominal pain, and was seen by Dr Kelsey. Patient was trying gluten free and ETOH free diet. She notes her symptoms of bloating has improved. She had EGD with EMR of a Duodenal polyp ( TA without Dysplasia), she also had IM noted in the antrum. She now notes pain in her upper abdomen under the chest and went to ED and with noted elevated blood pressure. Patient had normal labs and CT of abdomen normal. Advised to see her PMD for MSK cause \par \par \par Likely MSK pain\par - LFT and Pancreatic enzymes from ED normal \par \par Gastric IM/ Duodenal polyp TA without Dysplasia\par - Follow up in 1 year\par - Procedure discussed with pathology in detail with patient \par \par Patient requests in person visit in January

## 2021-12-01 NOTE — HISTORY OF PRESENT ILLNESS
[Home] : at home, [unfilled] , at the time of the visit. [Verbal consent obtained from patient] : the patient, [unfilled] [___ Month(s) Ago] : [unfilled] month(s) ago [FreeTextEntry4] : Yehuda [_________] : Performed [unfilled] [de-identified] : 9/1/2021 [de-identified] : Patient is a 55 y/o female with PMHx of RA, Sjogrens, whom was recently seen at [facility] during Covid 19 scare. She had shoulder pain , upper abdominal pain, and was seen by Dr Kelsey. Patient was trying gluten free and ETOH free diet. She notes her symptoms of bloating has improved. She had EGD with EMR of a Duodenal polyp ( TA without Dysplasia), she also had IM noted in the antrum. She now notes pain in her upper abdomen under the chest and went to ED and with noted elevated blood pressure. Patient had normal labs and CT of abdomen normal.

## 2022-01-26 ENCOUNTER — APPOINTMENT (OUTPATIENT)
Dept: GASTROENTEROLOGY | Facility: CLINIC | Age: 57
End: 2022-01-26
Payer: COMMERCIAL

## 2022-01-26 PROCEDURE — 99442: CPT

## 2022-01-26 NOTE — HISTORY OF PRESENT ILLNESS
[Other Location: e.g. School (Enter Location, City,State)___] : at [unfilled], at the time of the visit. [Medical Office: ([facility])___] : at the medical office located in  [Verbal consent obtained from patient] : the patient, [unfilled] [FreeTextEntry4] : Yehuda [___ Month(s) Ago] : [unfilled] month(s) ago [_________] : Performed [unfilled] [de-identified] : 9/1/2021 [de-identified] : Patient is a 57 y/o female with PMHx of RA, Sjogrens, whom was recently seen at [facility] during Covid 19 scare. She had shoulder pain , upper abdominal pain, and was seen by Dr Kelsey. Patient was trying gluten free and ETOH free diet. She notes her symptoms of bloating has improved. She had EGD with EMR of a Duodenal polyp ( TA without Dysplasia), she also had IM noted in the antrum. She now notes pain in her upper abdomen under the chest and went to ED and with noted elevated blood pressure. Patient had normal labs and CT of abdomen normal. PAtient had reflux again taking famotidine.

## 2022-01-26 NOTE — ASSESSMENT
[FreeTextEntry1] : Patient is a 57 y/o female with PMHx of RA, Sjogrens, whom was recently seen at [facility] during Covid 19 scare. She had shoulder pain , upper abdominal pain, and was seen by Dr Kelsey. Patient was trying gluten free and ETOH free diet. She notes her symptoms of bloating has improved. She had EGD with EMR of a Duodenal polyp ( TA without Dysplasia), she also had IM noted in the antrum. She now notes pain in her upper abdomen under the chest and went to ED and with noted elevated blood pressure. Patient had normal labs and CT of abdomen normal. Patient had reflux again taking famotidine. \par \par \par Gastric IM/ Duodenal polyp TA without Dysplasia\par - Omeprazole daily\par - EGD in August 2022

## 2022-02-17 NOTE — ED PROVIDER NOTE - CHIEF COMPLAINT
Quality 265: Biopsy Follow-Up: Biopsy results reviewed, communicated, tracked, and documented Detail Level: Zone The patient is a 53y Female complaining of cough.

## 2022-08-16 ENCOUNTER — LABORATORY RESULT (OUTPATIENT)
Age: 57
End: 2022-08-16

## 2022-08-19 ENCOUNTER — TRANSCRIPTION ENCOUNTER (OUTPATIENT)
Age: 57
End: 2022-08-19

## 2022-08-19 ENCOUNTER — RESULT REVIEW (OUTPATIENT)
Age: 57
End: 2022-08-19

## 2022-08-19 ENCOUNTER — OUTPATIENT (OUTPATIENT)
Dept: OUTPATIENT SERVICES | Facility: HOSPITAL | Age: 57
LOS: 1 days | Discharge: HOME | End: 2022-08-19

## 2022-08-19 VITALS — DIASTOLIC BLOOD PRESSURE: 78 MMHG | SYSTOLIC BLOOD PRESSURE: 122 MMHG | RESPIRATION RATE: 18 BRPM | HEART RATE: 65 BPM

## 2022-08-19 VITALS
TEMPERATURE: 98 F | RESPIRATION RATE: 18 BRPM | HEIGHT: 66 IN | HEART RATE: 51 BPM | DIASTOLIC BLOOD PRESSURE: 86 MMHG | SYSTOLIC BLOOD PRESSURE: 120 MMHG | WEIGHT: 225.09 LBS

## 2022-08-19 DIAGNOSIS — Z98.890 OTHER SPECIFIED POSTPROCEDURAL STATES: Chronic | ICD-10-CM

## 2022-08-19 PROCEDURE — 88305 TISSUE EXAM BY PATHOLOGIST: CPT | Mod: 26

## 2022-08-19 PROCEDURE — 43239 EGD BIOPSY SINGLE/MULTIPLE: CPT

## 2022-08-19 NOTE — ASU DISCHARGE PLAN (ADULT/PEDIATRIC) - CALL YOUR DOCTOR IF YOU HAVE ANY OF THE FOLLOWING:
Bleeding that does not stop/Pain not relieved by Medications/Numbness, tingling, color or temperature change to extremity/Nausea and vomiting that does not stop/Inability to tolerate liquids or foods/Increased irritability or sluggishness

## 2022-08-19 NOTE — ASU DISCHARGE PLAN (ADULT/PEDIATRIC) - NS MD DC FALL RISK RISK
For information on Fall & Injury Prevention, visit: [email or web address] OR  [email or web address] OR  https://www.cdc.gov/steadi/patient.html

## 2022-08-19 NOTE — ASU PATIENT PROFILE, ADULT - FALL HARM RISK - UNIVERSAL INTERVENTIONS
Bed in lowest position, wheels locked, appropriate side rails in place/Call bell, personal items and telephone in reach/Instruct patient to call for assistance before getting out of bed or chair/Non-slip footwear when patient is out of bed/[place] to call system/Physically safe environment - no spills, clutter or unnecessary equipment/Purposeful Proactive Rounding/Room/bathroom lighting operational, light cord in reach

## 2022-08-19 NOTE — CHART NOTE - NSCHARTNOTEFT_GEN_A_CORE
PACU ANESTHESIA ADMISSION NOTE      Procedure: EGD  Post op diagnosis:  Gastritis    ____  Intubated  TV:______       Rate: ______      FiO2: ______    _x___  Patent Airway    _x___  Full return of protective reflexes    _x___  Full recovery from anesthesia / back to baseline status    Vitals:  T(C): 97.2F  HR: 73  BP: 106/68  RR: 18   SpO2: 100%    Mental Status:  _x___ Awake   _____ Alert   _____ Drowsy   _____ Sedated    Nausea/Vomiting:  _x___  NO       ______Yes,   See Post - Op Orders         Pain Scale (0-10):  __0___    Treatment: _x___ None    ____ See Post - Op/PCA Orders    Post - Operative Fluids:   __x__ Oral   ____ See Post - Op Orders    Plan: Discharge:   _x___Home       _____Floor     _____Critical Care    _____  Other:_________________    Comments:  No anesthesia issues or complications noted.  Discharge when criteria met.

## 2022-08-28 LAB — SURGICAL PATHOLOGY STUDY: SIGNIFICANT CHANGE UP

## 2022-08-29 DIAGNOSIS — K20.90 ESOPHAGITIS, UNSPECIFIED WITHOUT BLEEDING: ICD-10-CM

## 2022-08-29 DIAGNOSIS — J45.909 UNSPECIFIED ASTHMA, UNCOMPLICATED: ICD-10-CM

## 2022-08-29 DIAGNOSIS — M06.9 RHEUMATOID ARTHRITIS, UNSPECIFIED: ICD-10-CM

## 2022-08-29 DIAGNOSIS — G43.909 MIGRAINE, UNSPECIFIED, NOT INTRACTABLE, WITHOUT STATUS MIGRAINOSUS: ICD-10-CM

## 2022-08-29 DIAGNOSIS — K29.80 DUODENITIS WITHOUT BLEEDING: ICD-10-CM

## 2022-08-29 DIAGNOSIS — D13.2 BENIGN NEOPLASM OF DUODENUM: ICD-10-CM

## 2022-08-29 DIAGNOSIS — E78.00 PURE HYPERCHOLESTEROLEMIA, UNSPECIFIED: ICD-10-CM

## 2022-08-31 ENCOUNTER — APPOINTMENT (OUTPATIENT)
Dept: GASTROENTEROLOGY | Facility: CLINIC | Age: 57
End: 2022-08-31

## 2022-09-08 ENCOUNTER — APPOINTMENT (OUTPATIENT)
Dept: GASTROENTEROLOGY | Facility: CLINIC | Age: 57
End: 2022-09-08

## 2023-01-30 ENCOUNTER — EMERGENCY (EMERGENCY)
Facility: HOSPITAL | Age: 58
LOS: 0 days | Discharge: HOME | End: 2023-01-30
Attending: STUDENT IN AN ORGANIZED HEALTH CARE EDUCATION/TRAINING PROGRAM | Admitting: STUDENT IN AN ORGANIZED HEALTH CARE EDUCATION/TRAINING PROGRAM
Payer: COMMERCIAL

## 2023-01-30 VITALS
SYSTOLIC BLOOD PRESSURE: 135 MMHG | RESPIRATION RATE: 17 BRPM | DIASTOLIC BLOOD PRESSURE: 75 MMHG | OXYGEN SATURATION: 100 % | HEART RATE: 55 BPM

## 2023-01-30 VITALS
OXYGEN SATURATION: 100 % | RESPIRATION RATE: 18 BRPM | DIASTOLIC BLOOD PRESSURE: 74 MMHG | HEIGHT: 66 IN | WEIGHT: 225.09 LBS | TEMPERATURE: 98 F | HEART RATE: 55 BPM | SYSTOLIC BLOOD PRESSURE: 172 MMHG

## 2023-01-30 DIAGNOSIS — M35.00 SJOGREN SYNDROME, UNSPECIFIED: ICD-10-CM

## 2023-01-30 DIAGNOSIS — E78.00 PURE HYPERCHOLESTEROLEMIA, UNSPECIFIED: ICD-10-CM

## 2023-01-30 DIAGNOSIS — G43.909 MIGRAINE, UNSPECIFIED, NOT INTRACTABLE, WITHOUT STATUS MIGRAINOSUS: ICD-10-CM

## 2023-01-30 DIAGNOSIS — R51.9 HEADACHE, UNSPECIFIED: ICD-10-CM

## 2023-01-30 DIAGNOSIS — Z98.890 OTHER SPECIFIED POSTPROCEDURAL STATES: Chronic | ICD-10-CM

## 2023-01-30 DIAGNOSIS — Z87.19 PERSONAL HISTORY OF OTHER DISEASES OF THE DIGESTIVE SYSTEM: ICD-10-CM

## 2023-01-30 DIAGNOSIS — J45.909 UNSPECIFIED ASTHMA, UNCOMPLICATED: ICD-10-CM

## 2023-01-30 DIAGNOSIS — M06.9 RHEUMATOID ARTHRITIS, UNSPECIFIED: ICD-10-CM

## 2023-01-30 DIAGNOSIS — Z98.891 HISTORY OF UTERINE SCAR FROM PREVIOUS SURGERY: Chronic | ICD-10-CM

## 2023-01-30 PROCEDURE — 70450 CT HEAD/BRAIN W/O DYE: CPT | Mod: 26,MA

## 2023-01-30 PROCEDURE — 99284 EMERGENCY DEPT VISIT MOD MDM: CPT

## 2023-01-30 RX ORDER — ACETAMINOPHEN 500 MG
975 TABLET ORAL ONCE
Refills: 0 | Status: COMPLETED | OUTPATIENT
Start: 2023-01-30 | End: 2023-01-30

## 2023-01-30 RX ORDER — METOCLOPRAMIDE HCL 10 MG
10 TABLET ORAL ONCE
Refills: 0 | Status: COMPLETED | OUTPATIENT
Start: 2023-01-30 | End: 2023-01-30

## 2023-01-30 RX ORDER — MAGNESIUM SULFATE 500 MG/ML
2 VIAL (ML) INJECTION ONCE
Refills: 0 | Status: COMPLETED | OUTPATIENT
Start: 2023-01-30 | End: 2023-01-30

## 2023-01-30 RX ORDER — SODIUM CHLORIDE 9 MG/ML
1000 INJECTION INTRAMUSCULAR; INTRAVENOUS; SUBCUTANEOUS ONCE
Refills: 0 | Status: COMPLETED | OUTPATIENT
Start: 2023-01-30 | End: 2023-01-30

## 2023-01-30 RX ADMIN — Medication 975 MILLIGRAM(S): at 03:03

## 2023-01-30 RX ADMIN — Medication 104 MILLIGRAM(S): at 03:15

## 2023-01-30 RX ADMIN — Medication 150 GRAM(S): at 03:43

## 2023-01-30 RX ADMIN — SODIUM CHLORIDE 1000 MILLILITER(S): 9 INJECTION INTRAMUSCULAR; INTRAVENOUS; SUBCUTANEOUS at 03:15

## 2023-01-30 NOTE — ED ADULT NURSE NOTE - NSICDXPASTMEDICALHX_GEN_ALL_CORE_FT
PAST MEDICAL HISTORY:  Asthma     Back problem     Downing syndrome     High cholesterol     Migraine vestibular    Rheumatoid arthritis     Sjogren's disease     Vertigo     Vestibular migraine

## 2023-01-30 NOTE — ED ADULT TRIAGE NOTE - CHIEF COMPLAINT QUOTE
Patient complaining of persistent headache and pressure x3days with associated elevated blood pressure. Patient states BP at home was 183/111 and has no history of HTN.

## 2023-01-30 NOTE — ED ADULT NURSE NOTE - OBJECTIVE STATEMENT
Patient reports she has been having headaches for the last few days, tonight it was a throbbing headache different from her vestibular migraine headache. Patient took her blood pressure and it was 183/111, denies HTN history. Patient waited and re-took BP and it seme933/113. Patient came to ED for evaluation. Patient denies nausea, denies blurred vision, denies numbness or tingling.

## 2023-01-30 NOTE — ED PROVIDER NOTE - OBJECTIVE STATEMENT
ED Attending Physician Note      Patient : Dixie White Age: 6 week old Sex: female   MRN: [id number] Encounter Date: 2023      Attestation: I have seen the patient and discussed the plan of care with resident/APN. Refer to their note for further history, ROS, PMH, PSH. Please see below for clarifications and modifications to plan.    SUBJECTIVE  Briefly, this is a 6 week old female presenting with vomiting. Started 2 weeks. Vomiting with every feed. About 9 days ago, switched formula to Enfamil AR, which helped vomiting somewhat. Vomiting NBNB. Seen by PMD yesterday. Today, vomited this AM approx 3-4 hours after feeding, which was brown-black in color. UOP normal. Normal stools. Patient feeds [id number] 2oz per feed. Patient also with poor weight gain x3 weeks (no weight gain).    OBJECTIVE  ED Triage Vitals   ED Triage Vitals Group      Temp 03/07/23 1533 99.3 °F (37.4 °C)      Heart Rate 03/07/23 1539 147      Resp 03/07/23 1539 48      BP 03/07/23 1539 (!) 123/93      SpO2 03/07/23 1539 100 %      EtCO2 mmHg --       Height --       Weight 03/07/23 1533 8 lb 2.5 oz (3.7 kg)      Weight Scale Used --       BMI (Calculated) --       IBW/kg (Calculated) --        GENERAL: Alert in no acute distress  HEENT: Atraumatic, PERRLA, EOMI, mucous membranes moist  NECK: Supple, no prominent cervical lymphadenopathy  LUNGS: CTA bilaterally with no wheezing, no crackles, no retractions, no signs of respiratory distress  HEART: RRR, +S1S2, no murmur appreciated  GI: Nontender, nondistended, no appreciated organomegaly  : Deferred  EXT: No cyanosis or edema. No joint swelling or deformity  SKIN: Warm and well perfused. No rashes or bruising  NEURO: No obvious focal neuro deficits, mental status appropriate    ASSESSMENT/PLAN  Pt is a 6 week old female presenting with vomiting x2 weeks with associated poor weight gain. No fever. DDx includes PS, milk protein allergy, AGE, obstruction, ileus, constipation, gas, reflux,  swallowing dyscoordination, cardiac disease    Initial Plan:   -CMP, CBC, Coags, BNP  -US pylorus  -XR obstructive series  -UA/Cx  -NS bolus    Consults: surgery    Procedures    Results for orders placed or performed during the hospital encounter of 03/07/23   Prothrombin Time   Result Value Ref Range    Prothrombin Time 13.9 (H) 8.3 - 12.0 sec    INR 1.4     Partial Thromboplastin Time   Result Value Ref Range    PTT 22 20 - 37 sec   GLUCOSE, BEDSIDE - POINT OF CARE   Result Value Ref Range    GLUCOSE, BEDSIDE - POINT OF CARE 82 70 - 99 mg/dL       Imaging Results          US PYLORUS (Final result)  Result time 03/07/23 20:40:47    Final result                 Impression:    Hypertrophic pyloric stenosis.    Findings were discussed with Dr. RICHARDSON ESCOBAR by Trace Bernstein MD    Electronically Signed by: TRACE BERNSTEIN M.D.   Signed on: 2023 8:40 PM   Workstation ID: [id number]             Narrative:    EXAMINATION: US PYLORUS      EXAM DATE: 2023 8:10 PM    CLINICAL HISTORY: 42 days Female  vomiting with feeds     COMPARISON: None     TECHNIQUE:  Real-time ultrasound examination was performed in transverse and  longitudinal planes through the gastropyloric region only.    FINDINGS:  The pyloric muscle wall thickness measures 0.6 cm.  The pyloric channel  measures 2 centimeters in length.   Fluid empties into the duodenum.                                ED Medication Orders (From admission, onward)    None          ED Course as of 03/07/23 2137   Tue Mar 07, 2023   2135 US + for pyloric stenosis. Surgery notified. Will admit obs. Will DC remainder of workup except electrolytes. [MC]      ED Course User Index  [MC] Richardson Escobar MD       DISPO: Admit observation    ED Diagnosis   1. Pyloric stenosis            Richardson Escobar MD   2023 9:37 PM        Richardson Escobar MD  03/07/23 2137     58 yo female hx of HLD/GERD/asthma/ vestibular migraine present c/o headache for almost 3 days. checked her BP at home and it was 170s/100s so she comes to ED for evaluation. denies similar HA in the past. HA is throbbing like. tylenol gave mild relief. denies recent head injury/ fever/chill/recent illness/ coughing/sore throat/body aches. further denies numbness/weakness to extremities and slurred speech.

## 2023-01-30 NOTE — ED PROVIDER NOTE - PATIENT PORTAL LINK FT
You can access the FollowMyHealth Patient Portal offered by [facility] by registering at the following website: [email or web address]. By joining [employer]’s FollowMyHealth portal, you will also be able to view your health information using other applications (apps) compatible with our system.

## 2023-01-30 NOTE — ED PROVIDER NOTE - CLINICAL SUMMARY MEDICAL DECISION MAKING FREE TEXT BOX
37-year-old female past medical history presents with headache after noticing high blood pressure no fever no chills no weakness numbness tingling no shortness of breath.  Well appearing, NAD, non toxic. NCAT PERRLA EOMI neck supple non tender normal wob cta bl rrr abdomen s nt nd no rebound no guarding WWPx4 normal muscle strength. Considered brain bleed, infection, migraine, considered admission will DC.

## 2023-01-30 NOTE — ED PROVIDER NOTE - CARE PROVIDER_API CALL
Pb Koch)  Neurology  [address] [place] [address], [address]  [place] [address]  Phone: [phone]  Fax: [phone]  Follow Up Time:

## 2023-01-30 NOTE — ED PROVIDER NOTE - PHYSICAL EXAMINATION
CONSTITUTIONAL: Well-appearing; well-nourished; in no apparent distress.   EYES: PERRL; EOM intact.   CARDIOVASCULAR: Normal S1, S2; no murmurs, rubs, or gallops.   RESPIRATORY: Normal chest excursion with respiration; breath sounds clear and equal bilaterally; no wheezes, rhonchi, or rales.  GI/: Normal bowel sounds; non-distended; non-tender; no palpable organomegaly.   SKIN: No rash   NEURO/PSYCH: A & O x 4; CN II-XII grossly unremarkable. no drifting. strength equal to b/l upper and lower extremities. speaking coherently. nml cerebellum test. ambulate with nml and steady gait

## 2023-05-31 NOTE — ED ADULT NURSE NOTE - MODE OF DISCHARGE
Vss, meera po fluids, denies pain, ambulates easily. IV removed, catheter intact. Discharge instructions provided and states understanding. States ready to go home.  Discharged from facility with family per wheelchair.    
Ambulatory

## 2023-06-15 PROBLEM — K22.70 BARRETT'S ESOPHAGUS WITHOUT DYSPLASIA: Chronic | Status: ACTIVE | Noted: 2023-01-30

## 2023-06-15 PROBLEM — G43.809 OTHER MIGRAINE, NOT INTRACTABLE, WITHOUT STATUS MIGRAINOSUS: Chronic | Status: ACTIVE | Noted: 2023-01-30

## 2023-06-15 PROBLEM — M35.00 SJOGREN SYNDROME, UNSPECIFIED: Chronic | Status: ACTIVE | Noted: 2023-01-30

## 2023-07-26 ENCOUNTER — APPOINTMENT (OUTPATIENT)
Dept: GASTROENTEROLOGY | Facility: CLINIC | Age: 58
End: 2023-07-26
Payer: COMMERCIAL

## 2023-07-26 VITALS — WEIGHT: 228 LBS | BODY MASS INDEX: 36.64 KG/M2 | HEIGHT: 66 IN

## 2023-07-26 DIAGNOSIS — R07.9 CHEST PAIN, UNSPECIFIED: ICD-10-CM

## 2023-07-26 DIAGNOSIS — R89.8 OTHER ABNORMAL FINDINGS IN SPECIMENS FROM OTHER ORGANS, SYSTEMS AND TISSUES: ICD-10-CM

## 2023-07-26 DIAGNOSIS — K31.A0 GASTRIC INTESTINAL METAPLASIA, UNSPECIFIED: ICD-10-CM

## 2023-07-26 DIAGNOSIS — K44.9 DIAPHRAGMATIC HERNIA W/OUT OBSTRUCTION OR GANGRENE: ICD-10-CM

## 2023-07-26 DIAGNOSIS — Z78.9 OTHER SPECIFIED HEALTH STATUS: ICD-10-CM

## 2023-07-26 DIAGNOSIS — K21.9 DIAPHRAGMATIC HERNIA W/OUT OBSTRUCTION OR GANGRENE: ICD-10-CM

## 2023-07-26 PROCEDURE — 99213 OFFICE O/P EST LOW 20 MIN: CPT

## 2023-07-26 RX ORDER — LOSARTAN POTASSIUM 50 MG/1
50 TABLET, FILM COATED ORAL
Refills: 0 | Status: ACTIVE | COMMUNITY

## 2023-07-26 RX ORDER — ATORVASTATIN CALCIUM 80 MG/1
TABLET, FILM COATED ORAL
Refills: 0 | Status: ACTIVE | COMMUNITY

## 2023-07-26 RX ORDER — PANTOPRAZOLE 40 MG/1
40 TABLET, DELAYED RELEASE ORAL TWICE DAILY
Qty: 60 | Refills: 5 | Status: ACTIVE | COMMUNITY
Start: 2023-07-26 | End: 1900-01-01

## 2023-07-26 RX ORDER — OMEPRAZOLE 40 MG/1
40 CAPSULE, DELAYED RELEASE ORAL
Qty: 30 | Refills: 3 | Status: DISCONTINUED | COMMUNITY
Start: 2022-01-26 | End: 2023-07-26

## 2023-07-26 NOTE — PHYSICAL EXAM
[Alert] : alert [No Acute Distress] : no acute distress [Well Developed] : well developed [Obese (BMI >= 30)] : obese (BMI >= 30) [Sclera] : the sclera and conjunctiva were normal [Normal Appearance] : the appearance of the neck was normal [No Neck Mass] : no neck mass was observed [No Respiratory Distress] : no respiratory distress [No Acc Muscle Use] : no accessory muscle use [Respiration, Rhythm And Depth] : normal respiratory rhythm and effort [Auscultation Breath Sounds / Voice Sounds] : lungs were clear to auscultation bilaterally [Heart Rate And Rhythm] : heart rate was normal and rhythm regular [Normal S1, S2] : normal S1 and S2 [Murmurs] : no murmurs [None] : no edema [Normal] : normal bowel sounds, non-tender, no masses, soft, no no hepato-splenomegaly [Abnormal Walk] : normal gait [Normal Color / Pigmentation] : normal skin color and pigmentation [Oriented To Time, Place, And Person] : oriented to person, place, and time

## 2023-07-27 NOTE — REASON FOR VISIT
[Follow-up] : a follow-up of an existing diagnosis [FreeTextEntry1] : Chest Discomfort - Could be related to Acid Reflux

## 2023-07-27 NOTE — REVIEW OF SYSTEMS
[As Noted in HPI] : as noted in HPI [Chest Pain] : chest pain [Negative] : Heme/Lymph [Heart Rate Is Slow] : the heart rate was not slow [Heart Rate Is Fast] : the heart rate was not fast [Palpitations] : no palpitations [Leg Claudication (leg pain with exertion)] : no intermittent leg claudication [Lower Ext Edema (lower leg swelling)] : no lower extremity edema

## 2023-07-27 NOTE — HISTORY OF PRESENT ILLNESS
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 She C/O chest pain for which she has recently seen her Cardiologist and a Cardiac etiology has been ruled out. Her last EGD done 8/22 showed no evidence of duodenal dysplasia or neoplasia. Increased eosinophils were noted as well as focal IM of the antrum. Downing' Esophagus without dysplasia. She denied heartburn, dysphagia, vomiting, abdominal pain, weight loss, melena, blood in the stool, constipation, or diarrhea.

## 2023-07-27 NOTE — ASSESSMENT
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 She C/O chest pain for which she has recently seen her Cardiologist and a Cardiac etiology has been ruled out. Her last EGD done 8/22 showed no evidence of duodenal dysplasia or neoplasia. Increased eosinophils were noted as well as focal IM of the antrum. Downing' Esophagus without dysplasia. She denied heartburn, dysphagia, vomiting, abdominal pain, weight loss, melena, blood in the stool, constipation, or diarrhea. \par \par \par Duodenal polyp, TA without HGD\par \par - Need to repeat EGD to assess for recurrence; ;all risks and benefits discussed\par \par Downing's Esophagus without dysplasia\par \par - Continue PPI but increase to bid \par \par Antral IM without HGD\par \par - EGD to further assess to check for HGD\par \par Increased Eosinophils noted in esophagus on prior EGD, ?EOE, Chest pain may be due to reflux vs. EOE\par \par - Need to repeat EGD also to re-check the eosinophil count\par - PPI bid\par - F/U after EGD is done

## 2023-09-13 NOTE — ED ADULT NURSE NOTE - PLAN OF CARE
Quality 431: Preventive Care And Screening: Unhealthy Alcohol Use - Screening: Patient screened for unhealthy alcohol use using a single question and scores less than 2 times per year Detail Level: Detailed Quality 226: Preventive Care And Screening: Tobacco Use: Screening And Cessation Intervention: Patient screened for tobacco use and is an ex/non-smoker Quality 130: Documentation Of Current Medications In The Medical Record: Current Medications Documented Quality 111:Pneumonia Vaccination Status For Older Adults: Pneumococcal Vaccination Previously Received none Explanation of exam/test

## 2023-09-29 NOTE — ED ADULT NURSE NOTE - NSFALLRSKPASTHIST_ED_ALL_ED
Pt incontinent of large amount of urine.  Bed bath and maribell care done, bedding changed.  Pt continues pulling at everything.  Wife at bedside   no

## 2023-10-22 RX ORDER — METHOTREXATE 2.5 MG/1
0 TABLET ORAL
Refills: 0 | DISCHARGE
Start: 2023-10-22

## 2023-10-23 ENCOUNTER — TRANSCRIPTION ENCOUNTER (OUTPATIENT)
Age: 58
End: 2023-10-23

## 2023-10-23 ENCOUNTER — OUTPATIENT (OUTPATIENT)
Dept: OUTPATIENT SERVICES | Facility: HOSPITAL | Age: 58
LOS: 1 days | Discharge: HOME | End: 2023-10-23
Payer: COMMERCIAL

## 2023-10-23 ENCOUNTER — RESULT REVIEW (OUTPATIENT)
Age: 58
End: 2023-10-23

## 2023-10-23 VITALS
WEIGHT: 227.08 LBS | RESPIRATION RATE: 18 BRPM | TEMPERATURE: 98 F | HEIGHT: 66 IN | HEART RATE: 52 BPM | DIASTOLIC BLOOD PRESSURE: 92 MMHG | SYSTOLIC BLOOD PRESSURE: 136 MMHG

## 2023-10-23 VITALS
HEART RATE: 60 BPM | DIASTOLIC BLOOD PRESSURE: 82 MMHG | OXYGEN SATURATION: 97 % | SYSTOLIC BLOOD PRESSURE: 145 MMHG | RESPIRATION RATE: 17 BRPM

## 2023-10-23 DIAGNOSIS — Z98.890 OTHER SPECIFIED POSTPROCEDURAL STATES: Chronic | ICD-10-CM

## 2023-10-23 DIAGNOSIS — Z98.891 HISTORY OF UTERINE SCAR FROM PREVIOUS SURGERY: Chronic | ICD-10-CM

## 2023-10-23 DIAGNOSIS — R10.9 UNSPECIFIED ABDOMINAL PAIN: ICD-10-CM

## 2023-10-23 DIAGNOSIS — K22.70 BARRETT'S ESOPHAGUS WITHOUT DYSPLASIA: ICD-10-CM

## 2023-10-23 PROCEDURE — 88305 TISSUE EXAM BY PATHOLOGIST: CPT | Mod: 26

## 2023-10-23 PROCEDURE — 43239 EGD BIOPSY SINGLE/MULTIPLE: CPT

## 2023-10-23 PROCEDURE — 88312 SPECIAL STAINS GROUP 1: CPT

## 2023-10-23 PROCEDURE — 88312 SPECIAL STAINS GROUP 1: CPT | Mod: 26

## 2023-10-23 PROCEDURE — 88305 TISSUE EXAM BY PATHOLOGIST: CPT

## 2023-10-23 RX ORDER — SODIUM CHLORIDE 9 MG/ML
1000 INJECTION, SOLUTION INTRAVENOUS
Refills: 0 | Status: DISCONTINUED | OUTPATIENT
Start: 2023-10-23 | End: 2023-10-23

## 2023-10-23 NOTE — CHART NOTE - NSCHARTNOTEFT_GEN_A_CORE
PACU ANESTHESIA ADMISSION NOTE      Procedure:  EGD  Post op diagnosis: Gastritis       ____  Intubated  TV:______       Rate: ______      FiO2: ______    __x__  Patent Airway    __x__  Full return of protective reflexes    __x__  Full recovery from anesthesia / back to baseline status      Mental Status:  __x__ Awake   ___x__ Alert   _____ Drowsy   _____ Sedated    Nausea/Vomiting:  __x__ NO  ______Yes,   See Post - Op Orders          Pain Scale (0-10):  ___0__    Treatment: ____ None    __x__ See Post - Op/PCA Orders    Post - Operative Fluids:   ____ Oral   __x__ See Post - Op Orders    Plan: Discharge:   _x___Home       _____Floor     _____Critical Care    _____  Other:_________________    Comments: Patient had smooth intraoperative event, no anesthesia complication.    66  PACU Vital signs: HR:            BP:  162      /    84      RR:     16        O2 Sat:98       %     Temp 97.9

## 2023-10-23 NOTE — ASU DISCHARGE PLAN (ADULT/PEDIATRIC) - CARE PROVIDER_API CALL
Samm Bhat  Gastroenterology  [address] [place] [address]  Phone: [phone]  Fax: [phone]  Follow Up Time:

## 2023-10-23 NOTE — H&P PST ADULT - HISTORY OF PRESENT ILLNESS
58 female here for EGD   last EGD in 8/2022 was done for surveillance for duodenal adenoma and h/o IM.

## 2023-10-23 NOTE — ASU PATIENT PROFILE, ADULT - FALL HARM RISK - TYPE OF ASSESSMENT
ORTHOPEDIC CONSULTATION    Consultation  Jhoana Ramirez,  1936, MRN [id number]    [facility]  Pneumothorax on right [J93.9]  Generalized weakness [R53.1]  Fall, initial encounter [W19.XXXA]  Hip fracture, left, closed, initial encounter [S72.002A]  Chest pain, unspecified type [R07.9]  Closed displaced subtrochanteric fracture of left femur, initial encounter [S72.22XA]    PCP: Sergey Lopez MD, [phone]   Code status:  DNR       Extended Emergency Contact Information  Primary Emergency Contact: Tanmay Ramirez   [facility]  Home Phone: [phone]  Relation: Child  Secondary Emergency Contact: Earline Ramirez   [facility]  Home Phone: [phone]  Mobile Phone: [phone]  Relation: Child         CHIEF COMPLAINT: Hip fracture      HISTORY OF PRESENT ILLNESS:  The patient is seen in orthopedic consultation at the request of Dr.Katie Lima.  The patient is a 81 y.o. female with moderate pain of the left  hip. History was obtained from the chart as well as from the pt. On  she fell at home when ambulating, injuring her left hip. She was unable to get up and was eventually brought to the [facility] ED for further evaluation. Initial xrays showed a left greater trochanter fracture and a CT was also completed which showed extension of the fracture through the intertrochanteric space to the level of the lesser trochanter. She has been admitted for further treatment. Currently she reports ongoing pain in the left hip; it hurts with all movement. She denies numbness or tingling in the left foot.     PAST MEDICAL HISTORY:  Active Ambulatory (Non-Hospital) Problems    Diagnosis     Meningioma     Nicotine Dependence - Continuous     Hyperlipidemia     Coronary Artery Disease     Esophageal Reflux     Osteoarthritis     Vertigo     Dependence on supplemental oxygen     Past Medical History:   Diagnosis Date     Acute on chronic kidney failure      CAD (coronary  artery disease)      Cataract      Cerebral edema      COPD (chronic obstructive pulmonary disease)      Depression      Encephalopathy      Hypertension      Meningioma      Myocardial infarction      Osteoarthritis      Stroke          ALLERGIES:   Review of patient's allergies indicates   Allergies   Allergen Reactions     Corticosteroids (Glucocorticoids)      Cortisone Acetate      Hydroxyzine Hcl          MEDICATIONS UPON ADMISSION:  Medications were reviewed.  They include:   Prescriptions Prior to Admission   Medication Sig Dispense Refill Last Dose     albuterol (PROVENTIL HFA;VENTOLIN HFA) 90 mcg/actuation inhaler Inhale 1-2 puffs every 4 (four) hours as needed. Every 4-6 hours as needed.   Unknown at Unknown time     amLODIPine (NORVASC) 10 MG tablet Take 10 mg by mouth daily.   7/9/2017 at Unknown time     budesonide-formoterol (SYMBICORT) 80-4.5 mcg/actuation inhaler Inhale 2 puffs 2 (two) times a day.   7/9/2017 at Unknown time     levETIRAcetam (KEPPRA) 500 MG tablet Take 500 mg by mouth 2 (two) times a day.   7/9/2017 at Unknown time     omeprazole (PRILOSEC) 20 MG capsule Take 20 mg by mouth daily.   7/9/2017 at Unknown time     rOPINIRole (REQUIP) 2 MG tablet Take 2 mg by mouth at bedtime.   7/8/2017 at Unknown time         SOCIAL HISTORY:   she  reports that she has quit smoking. Her smoking use included Cigarettes. She has never used smokeless tobacco. She reports that she does not use illicit drugs.      FAMILY HISTORY:  family history is not on file.      REVIEW OF SYSTEMS:   See HPI, otherwise negative       PHYSICAL EXAMINATION:  Vitals: Temp:  [97.5  F (36.4  C)-98.3  F (36.8  C)] 97.5  F (36.4  C)  Heart Rate:  [76-92] 88  Resp:  [18-24] 20  BP: ()/(52-59) 107/53  General: On examination, the patient is resting comfortably, NAD, awake and alert and oriented to person, and and general circumstances   SKIN: No obvious wounds noted at the left hip. Hip is slightly internally rotated.    Pulses:  dorsalis pedis pulse is intact and equal bilaterally  Sensation: intact and equal bilaterally to the distal lower extremities.  Tenderness: TTP over the lateral left hip and greater trochanter.   ROM: Able to wiggle left toes; ROM of the left hip deferred at this time.      RADIOGRAPHIC EVALUATION:    CT Hip Without Contrast Left [[id number]] Collected: 07/09/17 1630        Order Status: Completed Updated: 07/09/17 1700       Narrative:         CT LEFT HIP  7/9/2017 4:30 PM    INDICATION: Fall, pain.  TECHNIQUE: Noncontrast. Axial, sagittal and coronal thin-section reconstruction. Dose reduction techniques were used.   COMPARISON: Plain films 07/09/2017    FINDINGS: There is an irregular transversely oriented fracture through the left greater trochanter extending to the base of the greater trochanter posteriorly, the proximal fragment is distracted and displaced superiorly, posteriorly, and medially by as   much as 1 cm. There is a subtle nondisplaced fracture extending from the posterior aspect of the greater trochanteric fracture into the intertrochanteric region along the posterior cortex, with extension to the base of the lesser trochanter through the   anterior cortex, best seen on series 2 image 58 and 59, and series 5 image 42 and 43. There is soft tissue stranding and hemorrhage adjacent to the fracture.    No additional fracture. Mild bony demineralization. Moderate degenerative change left hip joint and mild chondrocalcinosis.    Degenerative change of the pubic symphysis. Degenerative change left SI joint and visualized lumbar spine.         Impression:         CONCLUSION:  1.  Displaced fracture through the left greater trochanter. There is involvement of the intertrochanteric region with a nondisplaced component to the fracture extending into the intertrochanteric region to the base of the lesser trochanter.        PERTINENT LABS:  Lab Results: personally reviewed.   Lab Results   Component  Value Date     07/10/2017    K 5.4 (H) 07/10/2017     (H) 07/10/2017    CO2 25 07/10/2017    BUN 22 07/10/2017    CREATININE 1.73 (H) 07/10/2017    CALCIUM 8.1 (L) 07/10/2017     Lab Results   Component Value Date    WBC 5.4 07/10/2017    WBC 5.4 07/10/2017    HGB 8.4 (L) 07/10/2017    HGB 8.4 (L) 07/10/2017    HCT 27.7 (L) 07/10/2017    HCT 27.7 (L) 07/10/2017     (H) 07/10/2017     (H) 07/10/2017     (L) 07/10/2017     (L) 07/10/2017       IMPRESSION:  Left intertrochanteric femur fracture, non-displaced     PLAN:  This patient was discussed with , on-call surgeon for [facility] and they are in agreement with the following plan.   Scheduled for left hip IM nailing with  at approximately 1:00 PM today.  Discussed with the pt; she is agreeable to proceeding and understands the process.  Discussed with the medical resident; pt is ok to proceed with surgery and we agreed that she is able to sign her own consent.   Continue NPO.   Pain control prn.     Thank you for including [facility] in the care of Jhoana Ramirez. It has been a pleasure participating in her care.    Pancho Mckya PA-C  Date: 7/10/2017  Time: 10:18 AM    CC1:   Fabien HYMAN MD    CC2:   Sergey Lopez MD      Addendum:  I have met with and examined the patient and agree with the above. Plan for treating her non-displaced intertroch fracture with IMN. She will be WBAT after surgery and 1 month ASA for DVT prophylaxis. Risks and benefits discussed and all her and her son's questions were answered and they desire to proceed.    Selwyn Graham MD     Admission

## 2023-10-23 NOTE — ASU PREOP CHECKLIST - HEIGHT IN CM
Health Maintenance Summary     Topic Due On Due Status Completed On    Pap Smear - Cervical Cancer Screening  Mar 6, 2022 Not Due Mar 6, 2017    Immunization - TDAP Pregnancy  Hidden     IMMUNIZATION - DTaP/Tdap/Td Jul 21, 2023 Not Due Jul 21, 2013    Immunization-Influenza Sep 1, 2017 Overdue     Depression Screening Jan 22, 1988 Overdue           Patient is up to date, no discussion needed .           167.64

## 2023-10-23 NOTE — ASU PATIENT PROFILE, ADULT - NSICDXPASTSURGICALHX_GEN_ALL_CORE_FT
PAST SURGICAL HISTORY:  H/O eye surgery     History of      History of repair of ACL     Single delivery by  section

## 2023-10-24 NOTE — ED CDU PROVIDER INITIAL DAY NOTE - PROGRESS NOTE
Juan Ortiz  Neurosurgery  [address], [address]  [place] [address]  Phone: [phone]  Fax: [phone]  Follow Up Time: 2 weeks    Rajat Bowers  Cardiology  [address], [address]  [place] [address]  Phone: [phone]  Fax: [phone]  Follow Up Time: 2 weeks    Pierre Marrero  Gastroenterology  [address] [place] [address]  Phone: [phone]  Fax: [phone]  Follow Up Time: 2 weeks    Micheal Doherty  Internal Medicine  [address], [address]  [place] [address]  Phone: [phone]  Fax: [phone]  Follow Up Time: 2 weeks    Demetrio Haskins  Neurology  [address], [address]  [place] [address]  Phone: [phone]  Fax: [phone]  Follow Up Time: 2 weeks    Toyin Adler  Physical/Rehab Medicine  [address] [place] [address]  Phone: [phone]  Fax: [phone]  Follow Up Time: 1 month   Improved.

## 2023-10-26 LAB
SURGICAL PATHOLOGY STUDY: SIGNIFICANT CHANGE UP
SURGICAL PATHOLOGY STUDY: SIGNIFICANT CHANGE UP

## 2023-10-27 DIAGNOSIS — G43.809 OTHER MIGRAINE, NOT INTRACTABLE, WITHOUT STATUS MIGRAINOSUS: ICD-10-CM

## 2023-10-27 DIAGNOSIS — K29.50 UNSPECIFIED CHRONIC GASTRITIS WITHOUT BLEEDING: ICD-10-CM

## 2023-10-27 DIAGNOSIS — E78.00 PURE HYPERCHOLESTEROLEMIA, UNSPECIFIED: ICD-10-CM

## 2023-10-27 DIAGNOSIS — J45.909 UNSPECIFIED ASTHMA, UNCOMPLICATED: ICD-10-CM

## 2023-10-27 DIAGNOSIS — M35.00 SJOGREN SYNDROME, UNSPECIFIED: ICD-10-CM

## 2023-10-27 DIAGNOSIS — K22.70 BARRETT'S ESOPHAGUS WITHOUT DYSPLASIA: ICD-10-CM

## 2023-10-27 DIAGNOSIS — Z87.19 PERSONAL HISTORY OF OTHER DISEASES OF THE DIGESTIVE SYSTEM: ICD-10-CM

## 2023-10-27 DIAGNOSIS — Z91.048 OTHER NONMEDICINAL SUBSTANCE ALLERGY STATUS: ICD-10-CM

## 2023-10-27 DIAGNOSIS — K21.9 GASTRO-ESOPHAGEAL REFLUX DISEASE WITHOUT ESOPHAGITIS: ICD-10-CM

## 2023-10-27 DIAGNOSIS — M06.9 RHEUMATOID ARTHRITIS, UNSPECIFIED: ICD-10-CM

## 2023-11-06 ENCOUNTER — APPOINTMENT (OUTPATIENT)
Dept: GASTROENTEROLOGY | Facility: CLINIC | Age: 58
End: 2023-11-06
Payer: COMMERCIAL

## 2023-11-06 DIAGNOSIS — Z78.9 OTHER SPECIFIED HEALTH STATUS: ICD-10-CM

## 2023-11-06 DIAGNOSIS — K21.9 GASTRO-ESOPHAGEAL REFLUX DISEASE W/OUT ESOPHAGITIS: ICD-10-CM

## 2023-11-06 DIAGNOSIS — K22.70 BARRETT'S ESOPHAGUS W/OUT DYSPLASIA: ICD-10-CM

## 2023-11-06 DIAGNOSIS — K31.7 POLYP OF STOMACH AND DUODENUM: ICD-10-CM

## 2023-11-06 DIAGNOSIS — K59.00 CONSTIPATION, UNSPECIFIED: ICD-10-CM

## 2023-11-06 PROCEDURE — 99213 OFFICE O/P EST LOW 20 MIN: CPT | Mod: 95

## 2024-01-03 NOTE — HISTORY OF PRESENT ILLNESS
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  EGD showed no recurrence of the duodenal adenoma and pathology was negative. Pathology at the [facility] showed mild reflux changes without intestinal metaplasia or dysplasia.  She has eliminated gluten, lactose, alcohol, and caffeine from her diet with improvement in reflux and chest pain.   She denied dysphagia, vomiting, abdominal pain, weight loss, melena, blood in the stool, or diarrhea. She stated that she does not move her bowels as regularly as before. She stays active by playing tennis BIW.    [de-identified] : 10/23/23

## 2024-01-03 NOTE — ASSESSMENT
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  EGD showed no recurrence of the duodenal adenoma and pathology was negative. Pathology at the [facility] showed mild reflux changes without intestinal metaplasia or dysplasia.  She has eliminated gluten, lactose, alcohol, and caffeine from her diet with improvement in reflux and chest pain.   She denied dysphagia, vomiting, abdominal pain, weight loss, melena, blood in the stool, or diarrhea. she stated that she does not move her bowels as regularly as before. She stays active by playing tennis BIW.   H/O duodenal polyp without residual adenoma Downing's Esophagus/GERD - Results of EGD discussed with patient today; no IM or dysplasia noted at [facility] - Repeat EGD in 2 years - Patient will decrease the PPI to OD but she wants to taper off of it and take it prn - Continue GERD diet/lactose, gluten, alcohol and caffeine free  Constipation - Fiber supplements, Senokot, and Miralax recommended - Adequate water intake also recommended

## 2024-01-03 NOTE — REVIEW OF SYSTEMS
[Abdominal Pain] : no abdominal pain [Vomiting] : no vomiting [Diarrhea] : no diarrhea [Melena (black stool)] : no melena [Bleeding] : no bleeding [Fecal Incontinence (soiling)] : no fecal incontinence [Bloating (gassiness)] : no bloating

## 2024-02-11 ENCOUNTER — INPATIENT (INPATIENT)
Facility: HOSPITAL | Age: 59
LOS: 2 days | Discharge: HOME | DRG: 176 | End: 2024-02-14
Attending: STUDENT IN AN ORGANIZED HEALTH CARE EDUCATION/TRAINING PROGRAM | Admitting: STUDENT IN AN ORGANIZED HEALTH CARE EDUCATION/TRAINING PROGRAM
Payer: COMMERCIAL

## 2024-02-11 VITALS
DIASTOLIC BLOOD PRESSURE: 86 MMHG | RESPIRATION RATE: 18 BRPM | HEART RATE: 91 BPM | OXYGEN SATURATION: 99 % | WEIGHT: 220.02 LBS | SYSTOLIC BLOOD PRESSURE: 134 MMHG | HEIGHT: 66 IN | TEMPERATURE: 99 F

## 2024-02-11 DIAGNOSIS — Z98.891 HISTORY OF UTERINE SCAR FROM PREVIOUS SURGERY: Chronic | ICD-10-CM

## 2024-02-11 DIAGNOSIS — Z98.890 OTHER SPECIFIED POSTPROCEDURAL STATES: Chronic | ICD-10-CM

## 2024-02-11 LAB
ALBUMIN SERPL ELPH-MCNC: 3.9 G/DL — SIGNIFICANT CHANGE UP (ref 3.5–5.2)
ALP SERPL-CCNC: 80 U/L — SIGNIFICANT CHANGE UP (ref 30–115)
ALT FLD-CCNC: 20 U/L — SIGNIFICANT CHANGE UP (ref 0–41)
ANION GAP SERPL CALC-SCNC: 12 MMOL/L — SIGNIFICANT CHANGE UP (ref 7–14)
AST SERPL-CCNC: 24 U/L — SIGNIFICANT CHANGE UP (ref 0–41)
BASE EXCESS BLDV CALC-SCNC: 0.6 MMOL/L — SIGNIFICANT CHANGE UP (ref -2–3)
BASOPHILS # BLD AUTO: 0.03 K/UL — SIGNIFICANT CHANGE UP (ref 0–0.2)
BASOPHILS NFR BLD AUTO: 0.6 % — SIGNIFICANT CHANGE UP (ref 0–1)
BILIRUB SERPL-MCNC: 0.9 MG/DL — SIGNIFICANT CHANGE UP (ref 0.2–1.2)
BUN SERPL-MCNC: 14 MG/DL — SIGNIFICANT CHANGE UP (ref 10–20)
CA-I SERPL-SCNC: 1.17 MMOL/L — SIGNIFICANT CHANGE UP (ref 1.15–1.33)
CALCIUM SERPL-MCNC: 8.7 MG/DL — SIGNIFICANT CHANGE UP (ref 8.4–10.4)
CHLORIDE SERPL-SCNC: 106 MMOL/L — SIGNIFICANT CHANGE UP (ref 98–110)
CO2 SERPL-SCNC: 21 MMOL/L — SIGNIFICANT CHANGE UP (ref 17–32)
CREAT SERPL-MCNC: 1 MG/DL — SIGNIFICANT CHANGE UP (ref 0.7–1.5)
EGFR: 64 ML/MIN/1.73M2 — SIGNIFICANT CHANGE UP
EOSINOPHIL # BLD AUTO: 0.21 K/UL — SIGNIFICANT CHANGE UP (ref 0–0.7)
EOSINOPHIL NFR BLD AUTO: 4.3 % — SIGNIFICANT CHANGE UP (ref 0–8)
GAS PNL BLDV: 136 MMOL/L — SIGNIFICANT CHANGE UP (ref 136–145)
GAS PNL BLDV: SIGNIFICANT CHANGE UP
GLUCOSE SERPL-MCNC: 107 MG/DL — HIGH (ref 70–99)
HCO3 BLDV-SCNC: 26 MMOL/L — SIGNIFICANT CHANGE UP (ref 22–29)
HCT VFR BLD CALC: 38.4 % — SIGNIFICANT CHANGE UP (ref 37–47)
HCT VFR BLDA CALC: 38 % — SIGNIFICANT CHANGE UP (ref 34.5–46.5)
HGB BLD CALC-MCNC: 12.5 G/DL — SIGNIFICANT CHANGE UP (ref 11.7–16.1)
HGB BLD-MCNC: 12.5 G/DL — SIGNIFICANT CHANGE UP (ref 12–16)
IMM GRANULOCYTES NFR BLD AUTO: 0.2 % — SIGNIFICANT CHANGE UP (ref 0.1–0.3)
INR BLD: 1.05 RATIO — SIGNIFICANT CHANGE UP (ref 0.65–1.3)
LACTATE BLDV-MCNC: 0.8 MMOL/L — SIGNIFICANT CHANGE UP (ref 0.5–2)
LYMPHOCYTES # BLD AUTO: 1.03 K/UL — LOW (ref 1.2–3.4)
LYMPHOCYTES # BLD AUTO: 21.3 % — SIGNIFICANT CHANGE UP (ref 20.5–51.1)
MCHC RBC-ENTMCNC: 28 PG — SIGNIFICANT CHANGE UP (ref 27–31)
MCHC RBC-ENTMCNC: 32.6 G/DL — SIGNIFICANT CHANGE UP (ref 32–37)
MCV RBC AUTO: 85.9 FL — SIGNIFICANT CHANGE UP (ref 81–99)
MONOCYTES # BLD AUTO: 0.58 K/UL — SIGNIFICANT CHANGE UP (ref 0.1–0.6)
MONOCYTES NFR BLD AUTO: 12 % — HIGH (ref 1.7–9.3)
NEUTROPHILS # BLD AUTO: 2.97 K/UL — SIGNIFICANT CHANGE UP (ref 1.4–6.5)
NEUTROPHILS NFR BLD AUTO: 61.6 % — SIGNIFICANT CHANGE UP (ref 42.2–75.2)
NRBC # BLD: 0 /100 WBCS — SIGNIFICANT CHANGE UP (ref 0–0)
PCO2 BLDV: 44 MMHG — HIGH (ref 39–42)
PH BLDV: 7.38 — SIGNIFICANT CHANGE UP (ref 7.32–7.43)
PLATELET # BLD AUTO: 229 K/UL — SIGNIFICANT CHANGE UP (ref 130–400)
PMV BLD: 10.2 FL — SIGNIFICANT CHANGE UP (ref 7.4–10.4)
PO2 BLDV: 33 MMHG — SIGNIFICANT CHANGE UP (ref 25–45)
POTASSIUM BLDV-SCNC: 4.7 MMOL/L — SIGNIFICANT CHANGE UP (ref 3.5–5.1)
POTASSIUM SERPL-MCNC: 4.5 MMOL/L — SIGNIFICANT CHANGE UP (ref 3.5–5)
POTASSIUM SERPL-SCNC: 4.5 MMOL/L — SIGNIFICANT CHANGE UP (ref 3.5–5)
PROT SERPL-MCNC: 6.7 G/DL — SIGNIFICANT CHANGE UP (ref 6–8)
PROTHROM AB SERPL-ACNC: 12 SEC — SIGNIFICANT CHANGE UP (ref 9.95–12.87)
RBC # BLD: 4.47 M/UL — SIGNIFICANT CHANGE UP (ref 4.2–5.4)
RBC # FLD: 15.7 % — HIGH (ref 11.5–14.5)
SAO2 % BLDV: 54.4 % — LOW (ref 67–88)
SODIUM SERPL-SCNC: 139 MMOL/L — SIGNIFICANT CHANGE UP (ref 135–146)
WBC # BLD: 4.83 K/UL — SIGNIFICANT CHANGE UP (ref 4.8–10.8)
WBC # FLD AUTO: 4.83 K/UL — SIGNIFICANT CHANGE UP (ref 4.8–10.8)

## 2024-02-11 PROCEDURE — 99285 EMERGENCY DEPT VISIT HI MDM: CPT

## 2024-02-11 PROCEDURE — 93010 ELECTROCARDIOGRAM REPORT: CPT

## 2024-02-11 PROCEDURE — 71275 CT ANGIOGRAPHY CHEST: CPT | Mod: 26,MA

## 2024-02-11 RX ORDER — ALBUTEROL 90 UG/1
2.5 AEROSOL, METERED ORAL ONCE
Refills: 0 | Status: COMPLETED | OUTPATIENT
Start: 2024-02-11 | End: 2024-02-11

## 2024-02-11 RX ADMIN — ALBUTEROL 2.5 MILLIGRAM(S): 90 AEROSOL, METERED ORAL at 21:18

## 2024-02-11 NOTE — ED PROVIDER NOTE - CLINICAL SUMMARY MEDICAL DECISION MAKING FREE TEXT BOX
58 yr old f that presents with a chronic cough. labs, imaging. on imaging concern for PE. Labs and EKG were ordered and reviewed.  Imaging was ordered and reviewed by me.  Appropriate medications for patient's presenting complaints were ordered and effects were reassessed.  Patient's records (prior hospital, ED visit, and/or nursing home notes if available) were reviewed.  Additional history was obtained from EMS, family, and/or PCP (where available).  Escalation to admission/observation was considered.   Patient requires inpatient hospitalization - telemetry

## 2024-02-11 NOTE — ED PROVIDER NOTE - ATTENDING CONTRIBUTION TO CARE
58 yr old f w/ a pmh HLD, GERD, asthma, vestibular migraine who presents with cough x 4 mnths. Pt reports that for the past four months she has been having a non productive cough. Pt has attempted steroids and antibiotics with minimal relief. Of note, pt reports that the episodes are similar to her allergies induced asthma however, this it usually lasts a couple of weeks and self resolves. Pt denies any sob, chest pain, nausea, vomiting, fevers, chills or any other medical complaints.     VITAL SIGNS: I have reviewed nursing notes and confirm.  CONSTITUTIONAL: non-toxic, well appearing  SKIN: no rash, no petechiae.  EYES:  EOMI, pink conjunctiva, anicteric  ENT: tongue midline, no exudates, MMM  NECK: Supple; no meningismus, no JVD  CARD: RRR, no murmurs, equal radial pulses bilaterally 2+  RESP: CTAB, no respiratory distress  ABD: Soft, non-tender, non-distended, no peritoneal signs, no HSM, no CVA tenderness      58 yr old f that presents with a chronic cough. labs, imaging, reassess. dispo pending.

## 2024-02-11 NOTE — ED ADULT TRIAGE NOTE - CHIEF COMPLAINT QUOTE
pt sts she has been coughing since november and she was diagnosed with bronchial asthma a couple of weeks ago and the cough is not going away and she is coughing up some phlegm

## 2024-02-11 NOTE — ED ADULT TRIAGE NOTE - AS TEMP SITE
Progress Note   Hospital Medicine         Patient Name: Kenneth Pardo  MRN:  [id number]  [facility] Medicine Team: [facility] Naresh Franks MD  Date of Admission:  9/9/2020     Length of Stay:  LOS: 6 days   Expected Discharge Date: 9/16/2020  Principal Problem:  Hyponatremia       Subjective:     Interval History/Overnight Events:  Patient feeling better today, Cr improving, cont HRS meds  - stress test negative for ischemia  - planning for C-scope Thursday and d/c then;        Review of Systems   Constitutional: Negative for chills, fatigue, fever.   HENT: Negative for sore throat, trouble swallowing.    Eyes: Negative for photophobia, visual disturbance.   Respiratory: Negative for cough, shortness of breath.    Cardiovascular: Negative for chest pain, palpitations, leg swelling.   Gastrointestinal: Negative for abdominal pain, constipation, diarrhea, nausea, vomiting.   Endocrine: Negative for cold intolerance, heat intolerance.   Genitourinary: Negative for dysuria, frequency.   Musculoskeletal: Negative for arthralgias, myalgias.   Skin: Negative for rash, wound, erythema   Neurological: Negative for dizziness, syncope, weakness, light-headedness.   Psychiatric/Behavioral: Negative for confusion, hallucinations, anxiety  All other systems reviewed and are negative.    Objective:     Temp:  [97.8 °F (36.6 °C)-98.6 °F (37 °C)]   Pulse:  []   Resp:  [14-18]   BP: (111-140)/(68-87)   SpO2:  [98 %-99 %]       Physical Exam:  Constitutional: appears weak and ill  Head: Normocephalic and atraumatic.   Mouth/Throat: Oropharynx is clear and moist.   Eyes: EOM are normal. Pupils are equal, round, and reactive to light. positive scleral icterus.   Neck: Normal range of motion. Neck supple.   Cardiovascular: Normal rate and regular rhythm.  No murmur heard.  Pulmonary/Chest: Effort normal and breath sounds normal. No respiratory distress. No wheezes, rales, or rhonchi  Abdominal: Soft. Bowel sounds are normal.   positive distension, no tenderness  Musculoskeletal: Normal range of motion. No edema.   Neurological: Alert and oriented to person, place, and time.   Skin: Skin is warm and dry.   Psychiatric: Normal mood and affect. Behavior is normal.     Recent Labs   Lab 09/10/20  0441 09/11/20 0523 09/12/20  0624 09/13/20 0457 09/14/20  0515 09/15/20  0654   WBC 19.06* 19.92* 24.83* 23.90* 23.30* 22.91*   HGB 11.4* 11.0* 11.4* 11.9* 11.7* 11.9*   HCT 33.6* 33.7* 35.6* 38.4* 35.6* 36.6*    198 190 225 197 155     Recent Labs   Lab 09/11/20  0523 09/12/20  0624 09/13/20 0457 09/14/20  0515 09/15/20  0654   * 131* 129* 131* 130*   K 3.4* 4.1 4.1 3.9 4.2   CL 96 98 97 99 98   CO2 19* 21* 23 21* 22*   BUN 32* 27* 31* 34* 30*   CREATININE 1.5* 1.5* 1.7* 1.9* 1.8*   * 123* 108 101 163*   CALCIUM 7.9* 8.1* 8.0* 8.4* 8.5*   MG 2.1 2.2 2.1 2.1 2.1   PHOS 3.0 2.5* 2.8  --   --      Recent Labs   Lab 09/13/20 0457 09/14/20  0515 09/15/20  0654   ALKPHOS 202* 195* 204*   ALT 17 12 13   AST 24 24 27   ALBUMIN 2.7* 3.4* 3.7   PROT 4.9* 5.4* 5.3*   BILITOT 2.4* 2.3* 2.1*   INR 1.3* 1.5* 1.6*     Recent Labs   Lab 09/14/20  0830 09/14/20  1429 09/14/20  1734 09/14/20  2333 09/15/20  0737 09/15/20  1132   POCTGLUCOSE 126* 124* 130* 363* 173* 167*        ciprofloxacin HCl  500 mg Oral Q24H    escitalopram oxalate  5 mg Oral Daily    lactulose  20 g Oral BID    lidocaine  1 patch Transdermal Q24H    midodrine  15 mg Oral Q8H    octreotide  100 mcg Subcutaneous Q8H    pantoprazole  40 mg Oral Daily    rOPINIRole  0.25 mg Oral QHS       Assessment and Plan     Mr. Kenneth Pardo is a 56 y.o. male who presented to [facility] on 9/9/2020 with     Hospital Course:    Mr. Kenneth Pardo was admitted to Hospital Medicine for management of     Active Hospital Problems    Diagnosis  POA    *Hyponatremia [E87.1]  Yes    MERLY (acute kidney injury) [N17.9]  Unknown    Leukocytosis [D72.829]  Unknown    Pre-transplant evaluation  for liver transplant [Z01.818]  Not Applicable    Right leg DVT [I82.401]  Yes    Hepatorenal syndrome [K76.7]  Yes    Hyperkalemia [E87.5]  Yes    Alcoholic liver disease [K70.9]  Yes    Ascites due to alcoholic cirrhosis [K70.31]  Yes    Hepatic encephalopathy [K72.90]  Yes    Debility [R53.81]  Yes    Frequent falls [R29.6]  Not Applicable    Depression with anxiety [F41.8]  Yes    Restless leg [G25.81]  Yes    Insomnia [G47.00]  Yes    Sepsis, unspecified organism [A41.9]  Yes    Decompensated liver disease [K74.69]  Yes    Esophagitis, Entiat grade B [K20.8]  Yes    Portal hypertensive gastropathy [K76.6, K31.89]  Yes    Anasarca [R60.1]  Yes    Hypotension [I95.9]  Yes      Resolved Hospital Problems   No resolved problems to display.     #Decompensated alcoholic cirrhosis   Recurrent ascites   Portal hypertension   Portal hypertensive gastropathy   Hepatic encephalopathy    MELD-Na score: 25 at 9/15/2020  6:54 AM  MELD score: 20 at 9/15/2020  6:54 AM  Calculated from:  Serum Creatinine: 1.8 mg/dL at 9/15/2020  6:54 AM  Serum Sodium: 130 mmol/L at 9/15/2020  6:54 AM  Total Bilirubin: 2.1 mg/dL at 9/15/2020  6:54 AM  INR(ratio): 1.6 at 9/15/2020  6:54 AM  Age: 56 years 1 month    -Large ascites on exam and CT abdomen   -diagnostic and therapeutic paracentesis in am   -hold diuretics given hyponatremia and MERLY   -continue lactulose for HE   -continue PPI   -PETH pending  -hepatology consult   - IR paracentesis with 6.4L removed and negative for SBP on 9/9  - approved for inpatient liver transplant evaluation  - IR paracentesis with 6.2L removed on 9/14 and negative for SBP  - stress test negative  - C-scope Thursday        # Right Leg DVT  - seen on U/S; on eliquis, switching to heparin drip for procedure      #Hyponatremia   -Na 120 from recent baseline 136 in ED  -likely due to diuretic use, decompensated cirrhosis, SSRI use    -repeat Na 122 prior to transfer   -received 1L NS bolus in  ED  -hold diuretics (lasix, spironolactone)  - hold SSRI  - improving with fluid restriction 1L     #MERLY  # Hepatorenal syndrome   - Cr improving today, renal U/S reviewed and nephrology consulted;   - urine studies   - cont midodrine, octreotide and albumin         #Leukocytosis   -WBC improving   -no signs of overt infection or sepsis   -afebrile   -blood and urine cultures negative  -UA showed pyuria and received empiric dose of rocephin   - negative for SBP  - C. Diff negative as well     #Depression with anxiety   -no acute issue   -hold fluoxetine in setting of hyponatremia (?SIADH)  -continue lexapro      #Insomnia  -trazodone prn      #Debility with falls  -PT evaluation for gait training   -fall precaution      # Hypotension  - midodrine     Disposition : Thursday after C-scope    Naresh Franks MD  Medical Director [facility] Medicine  Spectra:  [phone]  Pager: [phone]       oral

## 2024-02-11 NOTE — ED PROVIDER NOTE - PHYSICAL EXAMINATION
VITAL SIGNS: I have reviewed nursing notes and confirm.  CONSTITUTIONAL: Well-appearing, non-toxic, in NAD  SKIN: Warm dry, normal skin turgor  HEAD: NCAT  EYES: No conjunctival injection, scleral anicteric  ENT: Moist mucous membranes, normal pharynx with no erythema or exudates  NECK: Supple; full ROM. Nontender. No cervical LAD  CARD: RRR, no murmurs, rubs or gallops  RESP: Clear to ausculation bilaterally.  No rales, rhonchi, or wheezing.  ABD: Soft, ndnt  EXT: Full ROM, no bony tenderness, no pedal edema, no calf tenderness  NEURO: Normal motor, normal sensory. CN II-XII grossly intact. Normal gait.  PSYCH: Cooperative, appropriate.

## 2024-02-11 NOTE — ED PROVIDER NOTE - OBJECTIVE STATEMENT
57yo female PMHx HLD, GERD, asthma, vestibular migraine who presents with cough x 4 mnths. +4 months of cough productive of clear mucus, no hemotpysis. PMd gave steroids and antibiotics x2 with minimal relief. Pt attributing sx to seasonal allergies, which usually improves with singulair prn. NO sob, chest pain, nausea, vomiting, fevers, chills or any other medical complaints.    Pt notes she had a laparascopic GB removal on 2/5 w/o complication.

## 2024-02-12 ENCOUNTER — TRANSCRIPTION ENCOUNTER (OUTPATIENT)
Age: 59
End: 2024-02-12

## 2024-02-12 DIAGNOSIS — I26.99 OTHER PULMONARY EMBOLISM WITHOUT ACUTE COR PULMONALE: ICD-10-CM

## 2024-02-12 DIAGNOSIS — Z90.49 ACQUIRED ABSENCE OF OTHER SPECIFIED PARTS OF DIGESTIVE TRACT: Chronic | ICD-10-CM

## 2024-02-12 LAB
ALBUMIN SERPL ELPH-MCNC: 4 G/DL — SIGNIFICANT CHANGE UP (ref 3.5–5.2)
ALP SERPL-CCNC: 81 U/L — SIGNIFICANT CHANGE UP (ref 30–115)
ALT FLD-CCNC: 18 U/L — SIGNIFICANT CHANGE UP (ref 0–41)
ANION GAP SERPL CALC-SCNC: 12 MMOL/L — SIGNIFICANT CHANGE UP (ref 7–14)
AST SERPL-CCNC: 18 U/L — SIGNIFICANT CHANGE UP (ref 0–41)
BASOPHILS # BLD AUTO: 0.03 K/UL — SIGNIFICANT CHANGE UP (ref 0–0.2)
BASOPHILS NFR BLD AUTO: 0.6 % — SIGNIFICANT CHANGE UP (ref 0–1)
BILIRUB SERPL-MCNC: 0.9 MG/DL — SIGNIFICANT CHANGE UP (ref 0.2–1.2)
BUN SERPL-MCNC: 9 MG/DL — LOW (ref 10–20)
CALCIUM SERPL-MCNC: 8.9 MG/DL — SIGNIFICANT CHANGE UP (ref 8.4–10.5)
CHLORIDE SERPL-SCNC: 104 MMOL/L — SIGNIFICANT CHANGE UP (ref 98–110)
CO2 SERPL-SCNC: 26 MMOL/L — SIGNIFICANT CHANGE UP (ref 17–32)
CREAT SERPL-MCNC: 0.8 MG/DL — SIGNIFICANT CHANGE UP (ref 0.7–1.5)
D DIMER BLD IA.RAPID-MCNC: 7443 NG/ML DDU — HIGH
EGFR: 84 ML/MIN/1.73M2 — SIGNIFICANT CHANGE UP
EOSINOPHIL # BLD AUTO: 0.08 K/UL — SIGNIFICANT CHANGE UP (ref 0–0.7)
EOSINOPHIL NFR BLD AUTO: 1.6 % — SIGNIFICANT CHANGE UP (ref 0–8)
FERRITIN SERPL-MCNC: 241 NG/ML — SIGNIFICANT CHANGE UP (ref 13–330)
GLUCOSE SERPL-MCNC: 103 MG/DL — HIGH (ref 70–99)
HCT VFR BLD CALC: 36.8 % — LOW (ref 37–47)
HCV AB S/CO SERPL IA: 0.04 COI — SIGNIFICANT CHANGE UP
HCV AB SERPL-IMP: SIGNIFICANT CHANGE UP
HGB BLD-MCNC: 11.9 G/DL — LOW (ref 12–16)
IMM GRANULOCYTES NFR BLD AUTO: 0.2 % — SIGNIFICANT CHANGE UP (ref 0.1–0.3)
LYMPHOCYTES # BLD AUTO: 1.38 K/UL — SIGNIFICANT CHANGE UP (ref 1.2–3.4)
LYMPHOCYTES # BLD AUTO: 27.7 % — SIGNIFICANT CHANGE UP (ref 20.5–51.1)
MAGNESIUM SERPL-MCNC: 2.1 MG/DL — SIGNIFICANT CHANGE UP (ref 1.8–2.4)
MCHC RBC-ENTMCNC: 27.6 PG — SIGNIFICANT CHANGE UP (ref 27–31)
MCHC RBC-ENTMCNC: 32.3 G/DL — SIGNIFICANT CHANGE UP (ref 32–37)
MCV RBC AUTO: 85.4 FL — SIGNIFICANT CHANGE UP (ref 81–99)
MONOCYTES # BLD AUTO: 0.62 K/UL — HIGH (ref 0.1–0.6)
MONOCYTES NFR BLD AUTO: 12.4 % — HIGH (ref 1.7–9.3)
NEUTROPHILS # BLD AUTO: 2.86 K/UL — SIGNIFICANT CHANGE UP (ref 1.4–6.5)
NEUTROPHILS NFR BLD AUTO: 57.5 % — SIGNIFICANT CHANGE UP (ref 42.2–75.2)
NRBC # BLD: 0 /100 WBCS — SIGNIFICANT CHANGE UP (ref 0–0)
NT-PROBNP SERPL-SCNC: <5 PG/ML — SIGNIFICANT CHANGE UP (ref 0–300)
PHOSPHATE SERPL-MCNC: 4.4 MG/DL — SIGNIFICANT CHANGE UP (ref 2.1–4.9)
PLATELET # BLD AUTO: 246 K/UL — SIGNIFICANT CHANGE UP (ref 130–400)
PMV BLD: 10.4 FL — SIGNIFICANT CHANGE UP (ref 7.4–10.4)
POTASSIUM SERPL-MCNC: 4.2 MMOL/L — SIGNIFICANT CHANGE UP (ref 3.5–5)
POTASSIUM SERPL-SCNC: 4.2 MMOL/L — SIGNIFICANT CHANGE UP (ref 3.5–5)
PROT SERPL-MCNC: 6.7 G/DL — SIGNIFICANT CHANGE UP (ref 6–8)
RBC # BLD: 4.31 M/UL — SIGNIFICANT CHANGE UP (ref 4.2–5.4)
RBC # FLD: 15.8 % — HIGH (ref 11.5–14.5)
SODIUM SERPL-SCNC: 142 MMOL/L — SIGNIFICANT CHANGE UP (ref 135–146)
TROPONIN T, HIGH SENSITIVITY RESULT: <6 NG/L — SIGNIFICANT CHANGE UP (ref 6–13)
WBC # BLD: 4.98 K/UL — SIGNIFICANT CHANGE UP (ref 4.8–10.8)
WBC # FLD AUTO: 4.98 K/UL — SIGNIFICANT CHANGE UP (ref 4.8–10.8)

## 2024-02-12 PROCEDURE — 80053 COMPREHEN METABOLIC PANEL: CPT

## 2024-02-12 PROCEDURE — 99222 1ST HOSP IP/OBS MODERATE 55: CPT

## 2024-02-12 PROCEDURE — 80048 BASIC METABOLIC PNL TOTAL CA: CPT

## 2024-02-12 PROCEDURE — 82728 ASSAY OF FERRITIN: CPT

## 2024-02-12 PROCEDURE — 93970 EXTREMITY STUDY: CPT | Mod: 26

## 2024-02-12 PROCEDURE — 83735 ASSAY OF MAGNESIUM: CPT

## 2024-02-12 PROCEDURE — 0225U NFCT DS DNA&RNA 21 SARSCOV2: CPT

## 2024-02-12 PROCEDURE — 84100 ASSAY OF PHOSPHORUS: CPT

## 2024-02-12 PROCEDURE — 99223 1ST HOSP IP/OBS HIGH 75: CPT

## 2024-02-12 PROCEDURE — 84145 PROCALCITONIN (PCT): CPT

## 2024-02-12 PROCEDURE — 85025 COMPLETE CBC W/AUTO DIFF WBC: CPT

## 2024-02-12 PROCEDURE — 36415 COLL VENOUS BLD VENIPUNCTURE: CPT

## 2024-02-12 PROCEDURE — 93970 EXTREMITY STUDY: CPT

## 2024-02-12 PROCEDURE — 85379 FIBRIN DEGRADATION QUANT: CPT

## 2024-02-12 PROCEDURE — 94640 AIRWAY INHALATION TREATMENT: CPT

## 2024-02-12 PROCEDURE — 86803 HEPATITIS C AB TEST: CPT

## 2024-02-12 RX ORDER — ENOXAPARIN SODIUM 100 MG/ML
100 INJECTION SUBCUTANEOUS EVERY 12 HOURS
Refills: 0 | Status: DISCONTINUED | OUTPATIENT
Start: 2024-02-12 | End: 2024-02-12

## 2024-02-12 RX ORDER — MONTELUKAST 4 MG/1
10 TABLET, CHEWABLE ORAL DAILY
Refills: 0 | Status: DISCONTINUED | OUTPATIENT
Start: 2024-02-12 | End: 2024-02-14

## 2024-02-12 RX ORDER — ALBUTEROL 90 UG/1
2 AEROSOL, METERED ORAL EVERY 6 HOURS
Refills: 0 | Status: DISCONTINUED | OUTPATIENT
Start: 2024-02-12 | End: 2024-02-13

## 2024-02-12 RX ORDER — PANTOPRAZOLE SODIUM 20 MG/1
40 TABLET, DELAYED RELEASE ORAL
Refills: 0 | DISCHARGE

## 2024-02-12 RX ORDER — LOSARTAN POTASSIUM 100 MG/1
100 TABLET, FILM COATED ORAL DAILY
Refills: 0 | Status: DISCONTINUED | OUTPATIENT
Start: 2024-02-12 | End: 2024-02-14

## 2024-02-12 RX ORDER — ALBUTEROL 90 UG/1
2.5 AEROSOL, METERED ORAL ONCE
Refills: 0 | Status: COMPLETED | OUTPATIENT
Start: 2024-02-12 | End: 2024-02-12

## 2024-02-12 RX ORDER — APIXABAN 2.5 MG/1
10 TABLET, FILM COATED ORAL EVERY 12 HOURS
Refills: 0 | Status: DISCONTINUED | OUTPATIENT
Start: 2024-02-12 | End: 2024-02-12

## 2024-02-12 RX ORDER — FAMOTIDINE 10 MG/ML
40 INJECTION INTRAVENOUS DAILY
Refills: 0 | Status: DISCONTINUED | OUTPATIENT
Start: 2024-02-12 | End: 2024-02-14

## 2024-02-12 RX ORDER — ROSUVASTATIN CALCIUM 5 MG/1
1 TABLET ORAL
Qty: 0 | Refills: 0 | DISCHARGE

## 2024-02-12 RX ORDER — FLUTICASONE PROPIONATE 50 MCG
1 SPRAY, SUSPENSION NASAL
Refills: 0 | Status: DISCONTINUED | OUTPATIENT
Start: 2024-02-12 | End: 2024-02-13

## 2024-02-12 RX ORDER — ENOXAPARIN SODIUM 100 MG/ML
100 INJECTION SUBCUTANEOUS ONCE
Refills: 0 | Status: COMPLETED | OUTPATIENT
Start: 2024-02-12 | End: 2024-02-12

## 2024-02-12 RX ORDER — MONTELUKAST 4 MG/1
1 TABLET, CHEWABLE ORAL
Qty: 0 | Refills: 0 | DISCHARGE

## 2024-02-12 RX ORDER — ATORVASTATIN CALCIUM 80 MG/1
20 TABLET, FILM COATED ORAL AT BEDTIME
Refills: 0 | Status: DISCONTINUED | OUTPATIENT
Start: 2024-02-12 | End: 2024-02-14

## 2024-02-12 RX ORDER — LEVOCETIRIZINE DIHYDROCHLORIDE 0.5 MG/ML
1 SOLUTION ORAL
Qty: 0 | Refills: 0 | DISCHARGE

## 2024-02-12 RX ORDER — APIXABAN 2.5 MG/1
10 TABLET, FILM COATED ORAL EVERY 12 HOURS
Refills: 0 | Status: DISCONTINUED | OUTPATIENT
Start: 2024-02-12 | End: 2024-02-14

## 2024-02-12 RX ADMIN — ATORVASTATIN CALCIUM 20 MILLIGRAM(S): 80 TABLET, FILM COATED ORAL at 21:45

## 2024-02-12 RX ADMIN — FAMOTIDINE 40 MILLIGRAM(S): 10 INJECTION INTRAVENOUS at 12:50

## 2024-02-12 RX ADMIN — Medication 1 SPRAY(S): at 20:11

## 2024-02-12 RX ADMIN — ENOXAPARIN SODIUM 100 MILLIGRAM(S): 100 INJECTION SUBCUTANEOUS at 01:47

## 2024-02-12 RX ADMIN — Medication 100 MILLIGRAM(S): at 14:26

## 2024-02-12 RX ADMIN — MONTELUKAST 10 MILLIGRAM(S): 4 TABLET, CHEWABLE ORAL at 12:49

## 2024-02-12 RX ADMIN — Medication 600 MILLIGRAM(S): at 17:39

## 2024-02-12 RX ADMIN — LOSARTAN POTASSIUM 100 MILLIGRAM(S): 100 TABLET, FILM COATED ORAL at 13:01

## 2024-02-12 RX ADMIN — APIXABAN 10 MILLIGRAM(S): 2.5 TABLET, FILM COATED ORAL at 14:45

## 2024-02-12 RX ADMIN — Medication 100 MILLIGRAM(S): at 21:46

## 2024-02-12 RX ADMIN — ALBUTEROL 2.5 MILLIGRAM(S): 90 AEROSOL, METERED ORAL at 01:19

## 2024-02-12 NOTE — H&P ADULT - HISTORY OF PRESENT ILLNESS
58 years old  female with  PMHx of htn, HLD, GERD, asthma, vestibular migraine, sjogren syndrome, RA  presents with cough x 4 mnths. +4 months of cough productive of clear mucus, Patient denies hemoptysis. She went to her PCP who gave her steroids and antibiotics x2 with minimal relief. Pt attributing sx to seasonal allergies, which usually improves with singulair prn. Patient denies sob, chest pain, nausea, vomiting, fevers, chills or any other medical complaints. Patient tested herself for COVID at home which was negative. She has been using albuterol inhaler 4-5 times a day which she says is unusual.   Pt notes she had a laparascopic GB removal on 2/5 w/o complication.  Patient was on methotrexate for 1 month in december for RA.     In ED:   Vitals: unremarkable  Labs: unremarkable  CTA: Scattered segmental pulmonary emboli greater on the left as above. No CT   evidence of acute right heart strain.

## 2024-02-12 NOTE — DISCHARGE NOTE PROVIDER - CARE PROVIDER_API CALL
Preston Rice .  Internal Medicine  [address] [place] [address]  Phone: [phone]  Fax: [phone]  Follow Up Time: 1 week   Preston Rice .  Internal Medicine  [address] [place] [address]  Phone: [phone]  Fax: [phone]  Follow Up Time: 1 week    Curry Castanon  Pulmonary Disease  [address] [place] [address] [address]  [place] [address]  Phone: [phone]  Fax: [phone]  Follow Up Time: 1 week

## 2024-02-12 NOTE — CONSULT NOTE ADULT - ASSESSMENT
Impression:  Provoked PE  Bilateral segmental PE  Recent HO Gall bladder surgery  HO Asthma  HO RA      Plan:    Switch to DOAC  LE duplex  Nebs as needed  Symbicort on discharge  OP follow up Impression:  Provoked PE  Bilateral segmental PE  Cough  Recent HO Gall bladder surgery  HO Asthma  HO RA        Plan:    Switch to DOAC  LE duplex  Nebs as needed  Viral panel  Symbicort on discharge  OP follow up Impression:    Provoked PE - recent GB surgery   Bilateral segmental PE  Cough  Recent HO Gall bladder surgery  HO Asthma  HO RA    Plan:    CTA chest with no parenchymal abnormalities   Segmental filling defects; no RHS   Duplex positive for peroneal vein DVTs bilaterally   Provoked event due to recent surgery ?   Can switch to DOAC such as Eliquis or Xarelto  Minimum 3 months of AC required  Recommend hematology outpatient for hypercoag workup given history of CTD   Cough likely asthma and post nasal drip  Azelastine nasal spray; Symbicort BID   Albuterol as needed   RVP panel   Recall as needed

## 2024-02-12 NOTE — CHART NOTE - NSCHARTNOTEFT_GEN_A_CORE
Received report that patient prelim duplex report showed DVT in R and L peroneal vein. Patient is on Eliquis full AC for PE. No further management.

## 2024-02-12 NOTE — DISCHARGE NOTE PROVIDER - NSDCMRMEDTOKEN_GEN_ALL_CORE_FT
losartan 100 mg oral tablet: 1 tab(s) orally  Pepcid 40 mg oral tablet: 1 tab(s) orally once a day (at bedtime)   Proventil HFA 90 mcg/inh inhalation aerosol: 2 puff(s) inhaled every 6 hours, As Needed -for bronchospasm   rosuvastatin 5 mg oral tablet: 1 tab(s) orally once a day  Singulair 10 mg oral tablet: 1 tab(s) orally once a day   Eliquis 5 mg oral tablet: 1 tab(s) orally Take 2 tablets 2 times a day for another 12 doses and then 1 tablet two times a day after that  losartan 100 mg oral tablet: 1 tab(s) orally once a day  Pepcid 40 mg oral tablet: 1 tab(s) orally once a day (at bedtime)   Proventil HFA 90 mcg/inh inhalation aerosol: 2 puff(s) inhaled every 6 hours, As Needed -for bronchospasm   rosuvastatin 5 mg oral tablet: 1 tab(s) orally once a day  Singulair 10 mg oral tablet: 1 tab(s) orally once a day

## 2024-02-12 NOTE — DISCHARGE NOTE PROVIDER - ATTENDING DISCHARGE PHYSICAL EXAMINATION:
Vital Signs Last 24 Hrs  T(C): 37.1 (13 Feb 2024 08:38), Max: 37.5 (13 Feb 2024 01:40)  T(F): 98.7 (13 Feb 2024 08:38), Max: 99.5 (13 Feb 2024 01:40)  HR: 66 (13 Feb 2024 08:38) (64 - 80)  BP: 118/66 (13 Feb 2024 08:38) (118/66 - 137/77)  BP(mean): 86 (13 Feb 2024 05:30) (86 - 95)  RR: 16 (13 Feb 2024 08:38) (16 - 18)  SpO2: 97% (13 Feb 2024 08:38) (96% - 99%)    Parameters below as of 13 Feb 2024 08:38  Patient On (Oxygen Delivery Method): room air        GENERAL: NAD  HEAD:  Atraumatic, Normocephalic  EYES: EOMI, PERRL, conjunctiva and sclera clear  ENMT: MMM, no angular cheilitis appreciated  NECK: Supple, trachea midline  Lung: normal work of breathing, cta b/l  Cardiovascular: S1&S2+, rrr, no m/r/g appreciated  ABDOMEN: soft, nt  : No goodwin catheter, no suprapubic pain to palpation  Neuro: Alert & follows commands, no flaccid paralysis in extremities appreciated  SKIN: warm and dry, no visible purulence in exposed areas

## 2024-02-12 NOTE — CONSULT NOTE ADULT - ATTENDING COMMENTS
Impression:    Provoked PE - recent GB surgery   Bilateral segmental PE  Cough  Recent HO Gall bladder surgery  HO Asthma  HO RA    Impression and plan above have been altered and are my own.

## 2024-02-12 NOTE — H&P ADULT - ATTENDING COMMENTS
***My note supersedes any discrepancies that may be above in the resident's note***    58 years old female with PMHx of  htn, HLD, GERD, asthma, vestibular migraine, sjogren syndrome, RA  presented with c/o persistent cough, worsening from 3 months. Found to have segmental PE on CTA.     #New PE   - likely provoked given recent surgery and may be bed rest  - pt asymptomatic, sating well on room air  - pt had recent symptoms of cold (doubt COVID)  - CTA : segmental PE, no rt heart restrain  - started on Lovenox  - f/u LE duplex  - s/p lovenox now on apixaban 10mg bid  - monitor oxygen saturation.   - f/u ferritin, RVP    #HTN  - c/w losartan    #Asthma  - takes albuterol inhaler  - takes montelukast 10mg    #HLD  - c/w rosuvastatin 5mg    #GERD  - takes famotitdine 40mg daily    #MISC  - DVT PPx: lovenox  - GI PPx: famotidine  - Diet: DASH  - Activity: IAT  - Labs: ORDERED  - Code: FULL CODE  - Dispo: ACUTE

## 2024-02-12 NOTE — DISCHARGE NOTE PROVIDER - HOSPITAL COURSE
58 years old  female with  PMHx of htn, HLD, GERD, asthma, vestibular migraine, sjogren syndrome, RA  presents with cough x 4 mnths. +4 months of cough productive of clear mucus, Patient denies hemoptysis. She went to her PCP who gave her steroids and antibiotics x2 with minimal relief. Pt attributing sx to seasonal allergies, which usually improves with singulair prn. Patient denies sob, chest pain, nausea, vomiting, fevers, chills or any other medical complaints. Patient tested herself for COVID at home which was negative. She has been using albuterol inhaler 4-5 times a day which she says is unusual.   Pt notes she had a laparascopic GB removal on 2/5 w/o complication.  Patient was on methotrexate for 1 month in december for RA.   In ED:   Vitals: unremarkable  Labs: unremarkable  CTA: Scattered segmental pulmonary emboli greater on the left as above. No CT   evidence of acute right heart strain.    #New PE doubt recent covid  - pt asymptomatic, sating well on room air  - pt had recent symptoms of cold (doubt COVID)  - CTA : segmental PE, no rt heart restrain  - started on Lovenox  - f/u LE duplex  - switched to eliquis  - monitor oxygen saturation.   - f/u ferritin, RVP    #HTN  - c/w losartan    #Asthma  - takes albuterol inhaler  - takes montelukast 10mg    #HLD  - c/w rosuvastatin 5mg    #GERD  - takes famotitdine 40mg daily    #MISC  - DVT PPx: Eliquis  - GI PPx: famotidine  - Diet: DASH  -   58 years old  female with  PMHx of htn, HLD, GERD, asthma, vestibular migraine, sjogren syndrome, RA  presents with cough x 4 mnths. +4 months of cough productive of clear mucus, Patient denies hemoptysis. She went to her PCP who gave her steroids and antibiotics x2 with minimal relief. Pt attributing sx to seasonal allergies, which usually improves with singulair prn. Patient denies sob, chest pain, nausea, vomiting, fevers, chills or any other medical complaints. Patient tested herself for COVID at home which was negative. She has been using albuterol inhaler 4-5 times a day which she says is unusual.   Pt notes she had a laparascopic GB removal on 2/5 w/o complication.  Patient was on methotrexate for 1 month in december for RA.   In ED:   Vitals: unremarkable  Labs: unremarkable  CTA: Scattered segmental pulmonary emboli greater on the left as above. No CT   evidence of acute right heart strain.    #New PE doubt recent covid  - pt asymptomatic, sating well on room air  - pt had recent symptoms of cold (doubt COVID)  - RVP Positive  - CTA : segmental PE, no rt heart restrain  - started on Lovenox  - LE duplex DVT in bilateral peroneal veins.  - switched to eliquis  - monitor oxygen saturation.     #HTN  - c/w losartan    #Asthma  - takes albuterol inhaler  - takes montelukast 10mg    #HLD  - c/w rosuvastatin 5mg    #GERD  - takes famotitdine 40mg daily    #MISC  - DVT PPx: Eliquis  - GI PPx: famotidine  - Diet: DASH   58F w/ Sjogren, RA, Asthma, HTN admitted for acute PE and subsequently found to have RSV. The patient did not require supplemental oxygen, did not have hemoptysis and tolerated ambulation on Room Air.    The patient is planned to be discharged on Eliquis with plan to have her follow up with her PCP within 1 week of discharge.

## 2024-02-12 NOTE — ED ADULT NURSE NOTE - OBJECTIVE STATEMENT
59 y/o female pt came c/o productive cough x 4 months (since November). Pt went to her PCP and received steroids and antibiotics with minimal relief. Pt denies sob, chest pain, nausea, vomiting, fevers, chills or any other medical complaints. Pt recently had a laparoscopic cholecystectomy on 2/5/24 w/o complication.

## 2024-02-12 NOTE — DISCHARGE NOTE PROVIDER - NSDCCPCAREPLAN_GEN_ALL_CORE_FT
PRINCIPAL DISCHARGE DIAGNOSIS  Diagnosis: Pulmonary embolism  Assessment and Plan of Treatment: During this hospitalization, you were diagnosed with a pulmonary embolsim. In your case, you have a  deep vein thrombosis (DVT) which is a blood clot in a large vein deep in a leg, arm, or elsewhere in the body. The clot can separate from the vein, travel to the lungs and cut off blood flow. This is a pulmonary embolism (PE). Pulmonary embolism is very serious. Both the prevention and the treatment are similar for DVT and PE.   To help prevent more blood clots from forming, please see your primary care doctor within one week of discharg, and please take your medicines exactly as instructed. Don’t skip doses. You have been prescribed a medication to thin the blood, so that more clots do not form.  Have all lab tests as recommended. This is very important when you take medicines to prevent blood clots. Make sure you stay active and walk for at least 30 minutes every day. When sitting for long periods of time, move your knees, ankles, feet, and toes.    If you smoke, get help to quit. Stay at a healthy weight. Try to exercise at least 30 minutes on most days. Before starting an exercise program, talk with your primary care provider. When traveling by car, make frequent stops to get up and move around. On long airplane rides, get up and move around when possible. If you can’t get up, wiggle your toes, move your ankles and tighten your calves to keep your blood moving.  Seek immediate medical care if you have pain, swelling, and redness in your leg, arm, or other body area. These symptoms may mean another blood clot. Also call your healthcare provider if you have signs and symptoms of bleeding, like blood in your urine, bleeding with bowel movements, or bleeding from the nose, gums, a cut, or vagina. Call 911 if you have symptoms of a blood clot in the lungs including: Chest pain, trouble breathing, coughing blood, fast heartbeat, heavy or uncontrolled bleeding.

## 2024-02-12 NOTE — H&P ADULT - NSHPLABSRESULTS_GEN_ALL_CORE
CT Angio Chest PE Protocol w/ IV Cont (02.11.24 @ 22:11) >  Scattered segmental pulmonary emboli greater on the left as above. No CT   evidence of acute right heart strain.

## 2024-02-12 NOTE — H&P ADULT - ASSESSMENT
58 years old female with PMHx of  htn, HLD, GERD, asthma, vestibular migraine, sjogren syndrome, RA  presented with c/o persistent cough, worsening from 3 months. Found to have segmental PE on CTA.     #New PE doubt recent covid  - pt asymptomatic, sating well on room air  - pt had recent symptoms of cold (doubt COVID)  - CTA : segmental PE, no rt heart restrain  - started on Lovenox  - f/u LE duplex  - switch to PO eliquis once stable  - monitor oxygen saturation.   - f/u ferritin, RVP    #HTN  - c/w losartan    #Asthma  - takes albuterol inhaler  - takes montelukast 10mg    #HLD  - c/w rosuvastatin    #GERD  - takes famotitdine    #MISC  - DVT PPx: lovenox  - GI PPx: famotidine  - Diet: DASH  - Activity: IAT  - Labs: ORDERED  - Code: FULL CODE  - Dispo: ACUTE   58 years old female with PMHx of  htn, HLD, GERD, asthma, vestibular migraine, sjogren syndrome, RA  presented with c/o persistent cough, worsening from 3 months. Found to have segmental PE on CTA.     #New PE doubt recent covid  - pt asymptomatic, sating well on room air  - pt had recent symptoms of cold (doubt COVID)  - CTA : segmental PE, no rt heart restrain  - started on Lovenox  - f/u LE duplex  - switch to PO eliquis once stable  - monitor oxygen saturation.   - f/u ferritin, RVP    #HTN  - c/w losartan    #Asthma  - takes albuterol inhaler  - takes montelukast 10mg    #HLD  - c/w rosuvastatin 5mg    #GERD  - takes famotitdine 40mg daily    #MISC  - DVT PPx: lovenox  - GI PPx: famotidine  - Diet: DASH  - Activity: IAT  - Labs: ORDERED  - Code: FULL CODE  - Dispo: ACUTE

## 2024-02-12 NOTE — CONSULT NOTE ADULT - SUBJECTIVE AND OBJECTIVE BOX
Patient is a 58y old  Female who presents with a chief complaint of cough (12 Feb 2024 15:07)      HPI:    58 years old  female with  PMHx of htn, HLD, GERD, asthma, vestibular migraine, sjogren syndrome, RA  presents with cough x 4 mnths. +4 months of cough productive of clear mucus, Patient denies hemoptysis. She went to her PCP who gave her steroids and antibiotics x2 with minimal relief. Pt attributing sx to seasonal allergies, which usually improves with singulair prn. Patient denies sob, chest pain, nausea, vomiting, fevers, chills or any other medical complaints. Patient tested herself for COVID at home which was negative. She has been using albuterol inhaler 4-5 times a day which she says is unusual.   Pt notes she had a laparascopic GB removal on 2/5 w/o complication.  Patient was on methotrexate for 1 month in december for RA.     In ED:   Vitals: unremarkable  Labs: unremarkable  CTA: Scattered segmental pulmonary emboli greater on the left as above. No CT   evidence of acute right heart strain.    Pulmonary consulted for cough and PE          ROS: as in HPI; All other systems reviewed are negative        PHYSICAL EXAM  Vital Signs Last 24 Hrs  T(C): 37 (12 Feb 2024 07:28), Max: 37.4 (11 Feb 2024 20:10)  T(F): 98.6 (12 Feb 2024 07:28), Max: 99.4 (11 Feb 2024 20:10)  HR: 79 (12 Feb 2024 13:02) (79 - 91)  BP: 137/77 (12 Feb 2024 13:02) (134/86 - 171/86)  BP(mean): --  RR: 18 (12 Feb 2024 07:28) (18 - 18)  SpO2: 99% (12 Feb 2024 07:28) (99% - 99%)    Parameters below as of 12 Feb 2024 07:28  Patient On (Oxygen Delivery Method): room air          CONSTITUTIONAL:  NAD    ENT:   Airway patent,     EYES:   Clear bilaterally,   pupils equal,   round and reactive to light.    CARDIAC:   Normal rate,   regular rhythm.    no edema    RESPIRATORY:   No wheezing   Normal chest expansion  Not tachypneic,    GASTROINTESTINAL:  Abdomen soft, non-tender,   No guarding,   Positive BS  Abdominal binder noted    MUSCULOSKELETAL:   Range of motion is not limited,    NEUROLOGICAL:   Alert and oriented   No motor deficits.    SKIN:   Skin normal color for race,   No evidence of rash.                I&O's Detail        LABS:                        11.9   4.98  )-----------( 246      ( 12 Feb 2024 06:45 )             36.8     12 Feb 2024 06:45    142    |  104    |  9      ----------------------------<  103    4.2     |  26     |  0.8      Ca    8.9        12 Feb 2024 06:45  Phos  4.4       12 Feb 2024 06:45  Mg     2.1       12 Feb 2024 06:45    TPro  6.7    /  Alb  4.0    /  TBili  0.9    /  DBili  x      /  AST  18     /  ALT  18     /  AlkPhos  81     12 Feb 2024 06:45  Amylase x     lipase x              CAPILLARY BLOOD GLUCOSE        PT/INR - ( 11 Feb 2024 22:59 )   PT: 12.00 sec;   INR: 1.05 ratio           Urinalysis Basic - ( 12 Feb 2024 06:45 )    Color: x / Appearance: x / SG: x / pH: x  Gluc: 103 mg/dL / Ketone: x  / Bili: x / Urobili: x   Blood: x / Protein: x / Nitrite: x   Leuk Esterase: x / RBC: x / WBC x   Sq Epi: x / Non Sq Epi: x / Bacteria: x      Culture        MEDICATIONS  (STANDING):  apixaban 10 milliGRAM(s) Oral every 12 hours  atorvastatin 20 milliGRAM(s) Oral at bedtime  benzonatate 100 milliGRAM(s) Oral three times a day  famotidine    Tablet 40 milliGRAM(s) Oral daily  guaiFENesin  milliGRAM(s) Oral every 12 hours  losartan 100 milliGRAM(s) Oral daily  montelukast 10 milliGRAM(s) Oral daily    MEDICATIONS  (PRN):  albuterol    90 MICROgram(s) HFA Inhaler 2 Puff(s) Inhalation every 6 hours PRN Bronchospasm

## 2024-02-12 NOTE — DISCHARGE NOTE PROVIDER - CARE PROVIDERS DIRECT ADDRESSES
,[email or web address]

## 2024-02-12 NOTE — DISCHARGE NOTE PROVIDER - PROVIDER TOKENS
PROVIDER:[TOKEN:[[id number]],FOLLOWUP:[1 week]] PROVIDER:[TOKEN:[[id number]],FOLLOWUP:[1 week]],PROVIDER:[TOKEN:[[id number]],FOLLOWUP:[1 week]]

## 2024-02-12 NOTE — ED ADULT NURSE NOTE - NSICDXPASTSURGICALHX_GEN_ALL_CORE_FT
PAST SURGICAL HISTORY:  H/O eye surgery     History of      History of repair of ACL     S/P laparoscopic cholecystectomy     Single delivery by  section

## 2024-02-13 ENCOUNTER — TRANSCRIPTION ENCOUNTER (OUTPATIENT)
Age: 59
End: 2024-02-13

## 2024-02-13 VITALS
HEART RATE: 70 BPM | RESPIRATION RATE: 18 BRPM | SYSTOLIC BLOOD PRESSURE: 132 MMHG | TEMPERATURE: 99 F | DIASTOLIC BLOOD PRESSURE: 78 MMHG | OXYGEN SATURATION: 98 %

## 2024-02-13 DIAGNOSIS — I26.99 OTHER PULMONARY EMBOLISM WITHOUT ACUTE COR PULMONALE: ICD-10-CM

## 2024-02-13 DIAGNOSIS — B33.8 OTHER SPECIFIED VIRAL DISEASES: ICD-10-CM

## 2024-02-13 LAB
ANION GAP SERPL CALC-SCNC: 12 MMOL/L — SIGNIFICANT CHANGE UP (ref 7–14)
BASOPHILS # BLD AUTO: 0.03 K/UL — SIGNIFICANT CHANGE UP (ref 0–0.2)
BASOPHILS NFR BLD AUTO: 0.7 % — SIGNIFICANT CHANGE UP (ref 0–1)
BUN SERPL-MCNC: 6 MG/DL — LOW (ref 10–20)
CALCIUM SERPL-MCNC: 9 MG/DL — SIGNIFICANT CHANGE UP (ref 8.4–10.5)
CHLORIDE SERPL-SCNC: 102 MMOL/L — SIGNIFICANT CHANGE UP (ref 98–110)
CO2 SERPL-SCNC: 26 MMOL/L — SIGNIFICANT CHANGE UP (ref 17–32)
CREAT SERPL-MCNC: 0.7 MG/DL — SIGNIFICANT CHANGE UP (ref 0.7–1.5)
EGFR: 99 ML/MIN/1.73M2 — SIGNIFICANT CHANGE UP
EOSINOPHIL # BLD AUTO: 0.23 K/UL — SIGNIFICANT CHANGE UP (ref 0–0.7)
EOSINOPHIL NFR BLD AUTO: 5 % — SIGNIFICANT CHANGE UP (ref 0–8)
GLUCOSE SERPL-MCNC: 91 MG/DL — SIGNIFICANT CHANGE UP (ref 70–99)
HCT VFR BLD CALC: 37.5 % — SIGNIFICANT CHANGE UP (ref 37–47)
HGB BLD-MCNC: 12.1 G/DL — SIGNIFICANT CHANGE UP (ref 12–16)
IMM GRANULOCYTES NFR BLD AUTO: 0.2 % — SIGNIFICANT CHANGE UP (ref 0.1–0.3)
LYMPHOCYTES # BLD AUTO: 1.41 K/UL — SIGNIFICANT CHANGE UP (ref 1.2–3.4)
LYMPHOCYTES # BLD AUTO: 30.8 % — SIGNIFICANT CHANGE UP (ref 20.5–51.1)
MCHC RBC-ENTMCNC: 27.6 PG — SIGNIFICANT CHANGE UP (ref 27–31)
MCHC RBC-ENTMCNC: 32.3 G/DL — SIGNIFICANT CHANGE UP (ref 32–37)
MCV RBC AUTO: 85.6 FL — SIGNIFICANT CHANGE UP (ref 81–99)
MONOCYTES # BLD AUTO: 0.57 K/UL — SIGNIFICANT CHANGE UP (ref 0.1–0.6)
MONOCYTES NFR BLD AUTO: 12.4 % — HIGH (ref 1.7–9.3)
NEUTROPHILS # BLD AUTO: 2.33 K/UL — SIGNIFICANT CHANGE UP (ref 1.4–6.5)
NEUTROPHILS NFR BLD AUTO: 50.9 % — SIGNIFICANT CHANGE UP (ref 42.2–75.2)
NRBC # BLD: 0 /100 WBCS — SIGNIFICANT CHANGE UP (ref 0–0)
PLATELET # BLD AUTO: 247 K/UL — SIGNIFICANT CHANGE UP (ref 130–400)
PMV BLD: 10.4 FL — SIGNIFICANT CHANGE UP (ref 7.4–10.4)
POTASSIUM SERPL-MCNC: 4.4 MMOL/L — SIGNIFICANT CHANGE UP (ref 3.5–5)
POTASSIUM SERPL-SCNC: 4.4 MMOL/L — SIGNIFICANT CHANGE UP (ref 3.5–5)
PROCALCITONIN SERPL-MCNC: 0.05 NG/ML — SIGNIFICANT CHANGE UP (ref 0.02–0.1)
RAPID RVP RESULT: DETECTED
RBC # BLD: 4.38 M/UL — SIGNIFICANT CHANGE UP (ref 4.2–5.4)
RBC # FLD: 15.9 % — HIGH (ref 11.5–14.5)
RSV RNA SPEC QL NAA+PROBE: DETECTED
SARS-COV-2 RNA SPEC QL NAA+PROBE: SIGNIFICANT CHANGE UP
SODIUM SERPL-SCNC: 140 MMOL/L — SIGNIFICANT CHANGE UP (ref 135–146)
WBC # BLD: 4.58 K/UL — LOW (ref 4.8–10.8)
WBC # FLD AUTO: 4.58 K/UL — LOW (ref 4.8–10.8)

## 2024-02-13 PROCEDURE — 99239 HOSP IP/OBS DSCHRG MGMT >30: CPT | Mod: GC

## 2024-02-13 RX ORDER — BUDESONIDE AND FORMOTEROL FUMARATE DIHYDRATE 160; 4.5 UG/1; UG/1
2 AEROSOL RESPIRATORY (INHALATION)
Refills: 0 | Status: DISCONTINUED | OUTPATIENT
Start: 2024-02-13 | End: 2024-02-14

## 2024-02-13 RX ORDER — APIXABAN 2.5 MG/1
1 TABLET, FILM COATED ORAL
Qty: 54 | Refills: 0
Start: 2024-02-13 | End: 2024-03-13

## 2024-02-13 RX ORDER — LOSARTAN POTASSIUM 100 MG/1
1 TABLET, FILM COATED ORAL
Qty: 0 | Refills: 0 | DISCHARGE

## 2024-02-13 RX ORDER — APIXABAN 2.5 MG/1
1 TABLET, FILM COATED ORAL
Qty: 74 | Refills: 0
Start: 2024-02-13 | End: 2024-03-13

## 2024-02-13 RX ADMIN — Medication 100 MILLIGRAM(S): at 05:55

## 2024-02-13 RX ADMIN — LOSARTAN POTASSIUM 100 MILLIGRAM(S): 100 TABLET, FILM COATED ORAL at 05:54

## 2024-02-13 RX ADMIN — Medication 1 SPRAY(S): at 05:55

## 2024-02-13 RX ADMIN — APIXABAN 10 MILLIGRAM(S): 2.5 TABLET, FILM COATED ORAL at 05:55

## 2024-02-13 RX ADMIN — FAMOTIDINE 20 MILLIGRAM(S): 10 INJECTION INTRAVENOUS at 12:22

## 2024-02-13 RX ADMIN — BUDESONIDE AND FORMOTEROL FUMARATE DIHYDRATE 2 PUFF(S): 160; 4.5 AEROSOL RESPIRATORY (INHALATION) at 12:21

## 2024-02-13 RX ADMIN — MONTELUKAST 10 MILLIGRAM(S): 4 TABLET, CHEWABLE ORAL at 12:25

## 2024-02-13 RX ADMIN — ALBUTEROL 2 PUFF(S): 90 AEROSOL, METERED ORAL at 03:28

## 2024-02-13 RX ADMIN — Medication 600 MILLIGRAM(S): at 05:54

## 2024-02-13 NOTE — DISCHARGE NOTE NURSING/CASE MANAGEMENT/SOCIAL WORK - PATIENT PORTAL LINK FT
You can access the FollowMyHealth Patient Portal offered by [facility] by registering at the following website: [email or web address]. By joining [employer]’s FollowMyHealth portal, you will also be able to view your health information using other applications (apps) compatible with our system.

## 2024-02-13 NOTE — PROGRESS NOTE ADULT - ASSESSMENT
patient evaluated at bedside and can be discharged to day on Eliquis    ***Incomplete full notaiton to follow 58F w/ Sjogren, RA, Asthma, HTN admitted for acute PE and subsequently found to have RSV. The patient did not require supplemental oxygen, did not have hemoptysis and tolerated ambulation on Room Air.    The patient is planned to be discharged on Eliquis with plan to have her follow up with her PCP within 1 week of discharge.

## 2024-02-13 NOTE — PROGRESS NOTE ADULT - SUBJECTIVE AND OBJECTIVE BOX
CC: 58F admit for Acute PE, RSV    SUBJECTIVE / OVERNIGHT EVENTS:  No acute events overnight. Patient seen and evaluated at bedside. NO sob. Patient has dry cough    ROS:  no chest pain    MEDICATIONS  (STANDING):  apixaban 10 milliGRAM(s) Oral every 12 hours  atorvastatin 20 milliGRAM(s) Oral at bedtime  benzonatate 100 milliGRAM(s) Oral three times a day  budesonide 160 MICROgram(s)/formoterol 4.5 MICROgram(s) Inhaler 2 Puff(s) Inhalation two times a day  famotidine    Tablet 40 milliGRAM(s) Oral daily  guaiFENesin  milliGRAM(s) Oral every 12 hours  losartan 100 milliGRAM(s) Oral daily  montelukast 10 milliGRAM(s) Oral daily    MEDICATIONS  (PRN):      Physical Exam  Vital Signs Last 24 Hrs  T(C): 37.1 (13 Feb 2024 08:38), Max: 37.5 (13 Feb 2024 01:40)  T(F): 98.7 (13 Feb 2024 08:38), Max: 99.5 (13 Feb 2024 01:40)  HR: 66 (13 Feb 2024 08:38) (64 - 80)  BP: 118/66 (13 Feb 2024 08:38) (118/66 - 137/77)  BP(mean): 86 (13 Feb 2024 05:30) (86 - 95)  RR: 16 (13 Feb 2024 08:38) (16 - 18)  SpO2: 97% (13 Feb 2024 08:38) (96% - 99%)    Parameters below as of 13 Feb 2024 08:38  Patient On (Oxygen Delivery Method): room air    GENERAL: NAD  HEAD:  Atraumatic, Normocephalic  Lung: normal work of breathing, cta b/l  Cardiovascular: S1&S2+, rrr, no m/r/g appreciated  ABDOMEN: soft, nt  Neuro: Alert & follows commands, no flaccid paralysis in extremities appreciated  SKIN: warm and dry, no visible purulence in exposed areas    LABS:                        12.1   4.58  )-----------( 247      ( 13 Feb 2024 06:18 )             37.5     02-13    140  |  102  |  6<L>  ----------------------------<  91  4.4   |  26  |  0.7    Ca    9.0      13 Feb 2024 06:18  Phos  4.4     02-12  Mg     2.1     02-12    TPro  6.7  /  Alb  4.0  /  TBili  0.9  /  DBili  x   /  AST  18  /  ALT  18  /  AlkPhos  81  02-12    PT/INR - ( 11 Feb 2024 22:59 )   PT: 12.00 sec;   INR: 1.05 ratio               Urinalysis Basic - ( 13 Feb 2024 06:18 )    Color: x / Appearance: x / SG: x / pH: x  Gluc: 91 mg/dL / Ketone: x  / Bili: x / Urobili: x   Blood: x / Protein: x / Nitrite: x   Leuk Esterase: x / RBC: x / WBC x   Sq Epi: x / Non Sq Epi: x / Bacteria: x          RADIOLOGY & ADDITIONAL TESTS:  Results Reviewed:   Imaging Personally Reviewed:  Electrocardiogram Personally Reviewed:    COORDINATION OF CARE:  Care Discussed with Consultants/Other Providers [Y/N]:  Prior or Outpatient Records Reviewed [Y/N]:   CC: 58F admit for Acute PE, RSV    SUBJECTIVE / OVERNIGHT EVENTS:  No acute events overnight. Patient seen and evaluated at bedside. NO sob. Patient has dry cough    ROS:  no chest pain    MEDICATIONS  (STANDING):  apixaban 10 milliGRAM(s) Oral every 12 hours  atorvastatin 20 milliGRAM(s) Oral at bedtime  benzonatate 100 milliGRAM(s) Oral three times a day  budesonide 160 MICROgram(s)/formoterol 4.5 MICROgram(s) Inhaler 2 Puff(s) Inhalation two times a day  famotidine    Tablet 40 milliGRAM(s) Oral daily  guaiFENesin  milliGRAM(s) Oral every 12 hours  losartan 100 milliGRAM(s) Oral daily  montelukast 10 milliGRAM(s) Oral daily    MEDICATIONS  (PRN):      Physical Exam  Vital Signs Last 24 Hrs  T(C): 37.1 (13 Feb 2024 08:38), Max: 37.5 (13 Feb 2024 01:40)  T(F): 98.7 (13 Feb 2024 08:38), Max: 99.5 (13 Feb 2024 01:40)  HR: 66 (13 Feb 2024 08:38) (64 - 80)  BP: 118/66 (13 Feb 2024 08:38) (118/66 - 137/77)  BP(mean): 86 (13 Feb 2024 05:30) (86 - 95)  RR: 16 (13 Feb 2024 08:38) (16 - 18)  SpO2: 97% (13 Feb 2024 08:38) (96% - 99%)    Parameters below as of 13 Feb 2024 08:38  Patient On (Oxygen Delivery Method): room air    GENERAL: NAD  HEAD:  Atraumatic, Normocephalic  Lung: normal work of breathing, dry cough+,cta b/l  Cardiovascular: S1&S2+, rrr, no m/r/g appreciated  ABDOMEN: soft, nt  Neuro: Alert & follows commands, no flaccid paralysis in extremities appreciated  SKIN: warm and dry, no visible purulence in exposed areas    LABS:                        12.1   4.58  )-----------( 247      ( 13 Feb 2024 06:18 )             37.5     02-13    140  |  102  |  6<L>  ----------------------------<  91  4.4   |  26  |  0.7    Ca    9.0      13 Feb 2024 06:18  Phos  4.4     02-12  Mg     2.1     02-12    TPro  6.7  /  Alb  4.0  /  TBili  0.9  /  DBili  x   /  AST  18  /  ALT  18  /  AlkPhos  81  02-12    PT/INR - ( 11 Feb 2024 22:59 )   PT: 12.00 sec;   INR: 1.05 ratio               Urinalysis Basic - ( 13 Feb 2024 06:18 )    Color: x / Appearance: x / SG: x / pH: x  Gluc: 91 mg/dL / Ketone: x  / Bili: x / Urobili: x   Blood: x / Protein: x / Nitrite: x   Leuk Esterase: x / RBC: x / WBC x   Sq Epi: x / Non Sq Epi: x / Bacteria: x          RADIOLOGY & ADDITIONAL TESTS:  Results Reviewed:   Imaging Personally Reviewed:  Electrocardiogram Personally Reviewed:    COORDINATION OF CARE:  Care Discussed with Consultants/Other Providers [Y/N]:  Prior or Outpatient Records Reviewed [Y/N]:

## 2024-02-13 NOTE — DISCHARGE NOTE NURSING/CASE MANAGEMENT/SOCIAL WORK - NSDCPEELIQUISREACT_GEN_ALL_CORE
Apixaban/Eliquis increases your risk for bleeding. Notify your doctor if you experience any of the following side effects: bleeding, coughing or vomiting blood, red or black stool, unexpected pain or swelling, itching or hives, chest pain, chest tightness, trouble breathing, changes in how much or how often you urinate, red or pink urine, numbness or tingling in your feet, or unusual muscle weakness. When Apixaban/Eliquis is taken with other medicines, they can affect how it works. Taking other medications such as aspirin, blood thinners, nonsteroidal anti-inflammatories, and medications that treat depression can increase your risk of bleeding. It is very important to tell your health care provider about all of the other medicines, including over-the-counter medications, herbs, and vitamins you are taking. DO NOT start, stop, or change the dosage of any medicine, including over-the-counter medicines, vitamins, and herbal products without your doctor’s approval. Any products containing aspirin or are nonsteroidal anti-inflammatories lessen the blood’s ability to form clots and add to the effect of Apixaban/Eliquis. Never take aspirin or medicines that contain aspirin without speaking to your doctor. normal bilaterally

## 2024-02-20 DIAGNOSIS — E78.5 HYPERLIPIDEMIA, UNSPECIFIED: ICD-10-CM

## 2024-02-20 DIAGNOSIS — J45.909 UNSPECIFIED ASTHMA, UNCOMPLICATED: ICD-10-CM

## 2024-02-20 DIAGNOSIS — I10 ESSENTIAL (PRIMARY) HYPERTENSION: ICD-10-CM

## 2024-02-20 DIAGNOSIS — I26.93 SINGLE SUBSEGMENTAL PULMONARY EMBOLISM WITHOUT ACUTE COR PULMONALE: ICD-10-CM

## 2024-02-20 DIAGNOSIS — M35.00 SJOGREN SYNDROME, UNSPECIFIED: ICD-10-CM

## 2024-02-20 DIAGNOSIS — K21.9 GASTRO-ESOPHAGEAL REFLUX DISEASE WITHOUT ESOPHAGITIS: ICD-10-CM

## 2024-02-20 DIAGNOSIS — G43.109 MIGRAINE WITH AURA, NOT INTRACTABLE, WITHOUT STATUS MIGRAINOSUS: ICD-10-CM

## 2024-02-20 DIAGNOSIS — B97.4 RESPIRATORY SYNCYTIAL VIRUS AS THE CAUSE OF DISEASES CLASSIFIED ELSEWHERE: ICD-10-CM

## 2024-02-20 DIAGNOSIS — J06.9 ACUTE UPPER RESPIRATORY INFECTION, UNSPECIFIED: ICD-10-CM

## 2024-04-30 ENCOUNTER — APPOINTMENT (OUTPATIENT)
Dept: PULMONOLOGY | Facility: CLINIC | Age: 59
End: 2024-04-30
Payer: COMMERCIAL

## 2024-04-30 VITALS
BODY MASS INDEX: 37.61 KG/M2 | HEART RATE: 64 BPM | DIASTOLIC BLOOD PRESSURE: 70 MMHG | HEIGHT: 66 IN | SYSTOLIC BLOOD PRESSURE: 130 MMHG | WEIGHT: 234 LBS

## 2024-04-30 DIAGNOSIS — R09.82 POSTNASAL DRIP: ICD-10-CM

## 2024-04-30 DIAGNOSIS — I26.99 OTHER PULMONARY EMBOLISM W/OUT ACUTE COR PULMONALE: ICD-10-CM

## 2024-04-30 DIAGNOSIS — I82.409 ACUTE EMBOLISM AND THROMBOSIS OF UNSPECIFIED DEEP VEINS OF UNSPECIFIED LOWER EXTREMITY: ICD-10-CM

## 2024-04-30 DIAGNOSIS — R05.9 COUGH, UNSPECIFIED: ICD-10-CM

## 2024-04-30 PROCEDURE — 99214 OFFICE O/P EST MOD 30 MIN: CPT

## 2024-04-30 NOTE — HISTORY OF PRESENT ILLNESS
[TextBox_4] : Recent hospital admission in February for PE and DVTs  Before that she had laparoscopic gallbladder surgery, recent travel as well CTA noted with segmental PE and LE bilateral DVTs  On room air  Cough is gone  Post nasal drip on nasal spray  RA and Sjogren following with rheum

## 2024-04-30 NOTE — ASSESSMENT
[FreeTextEntry1] : Cough has resolved  Post nasal drip on PRN nasal sprays   PE and DVT  Unprovoked ?  On Eliquis and following with hematology  Will likely require at least 6 months of AC then half dose afterwards  Explained to her that repeat imaging is not necessary  She wants to get it however  CTA and duplex LE ordered   3 months follow up

## 2024-06-10 ENCOUNTER — RESULT REVIEW (OUTPATIENT)
Age: 59
End: 2024-06-10

## 2024-06-10 ENCOUNTER — OUTPATIENT (OUTPATIENT)
Dept: OUTPATIENT SERVICES | Facility: HOSPITAL | Age: 59
LOS: 1 days | End: 2024-06-10
Payer: COMMERCIAL

## 2024-06-10 DIAGNOSIS — Z98.891 HISTORY OF UTERINE SCAR FROM PREVIOUS SURGERY: Chronic | ICD-10-CM

## 2024-06-10 DIAGNOSIS — Z98.890 OTHER SPECIFIED POSTPROCEDURAL STATES: Chronic | ICD-10-CM

## 2024-06-10 DIAGNOSIS — Z00.8 ENCOUNTER FOR OTHER GENERAL EXAMINATION: ICD-10-CM

## 2024-06-10 DIAGNOSIS — R22.9 LOCALIZED SWELLING, MASS AND LUMP, UNSPECIFIED: ICD-10-CM

## 2024-06-10 DIAGNOSIS — Z90.49 ACQUIRED ABSENCE OF OTHER SPECIFIED PARTS OF DIGESTIVE TRACT: Chronic | ICD-10-CM

## 2024-06-10 PROCEDURE — 93970 EXTREMITY STUDY: CPT | Mod: 26

## 2024-06-10 PROCEDURE — 93970 EXTREMITY STUDY: CPT

## 2024-06-11 DIAGNOSIS — R22.9 LOCALIZED SWELLING, MASS AND LUMP, UNSPECIFIED: ICD-10-CM

## 2024-06-25 ENCOUNTER — OUTPATIENT (OUTPATIENT)
Dept: OUTPATIENT SERVICES | Facility: HOSPITAL | Age: 59
LOS: 1 days | End: 2024-06-25
Payer: COMMERCIAL

## 2024-06-25 DIAGNOSIS — I26.99 OTHER PULMONARY EMBOLISM WITHOUT ACUTE COR PULMONALE: ICD-10-CM

## 2024-06-25 DIAGNOSIS — Z98.890 OTHER SPECIFIED POSTPROCEDURAL STATES: Chronic | ICD-10-CM

## 2024-06-25 DIAGNOSIS — Z98.891 HISTORY OF UTERINE SCAR FROM PREVIOUS SURGERY: Chronic | ICD-10-CM

## 2024-06-25 DIAGNOSIS — Z90.49 ACQUIRED ABSENCE OF OTHER SPECIFIED PARTS OF DIGESTIVE TRACT: Chronic | ICD-10-CM

## 2024-06-25 DIAGNOSIS — Z00.8 ENCOUNTER FOR OTHER GENERAL EXAMINATION: ICD-10-CM

## 2024-06-25 PROCEDURE — 71275 CT ANGIOGRAPHY CHEST: CPT

## 2024-06-25 PROCEDURE — 71275 CT ANGIOGRAPHY CHEST: CPT | Mod: 26

## 2024-06-26 DIAGNOSIS — I26.99 OTHER PULMONARY EMBOLISM WITHOUT ACUTE COR PULMONALE: ICD-10-CM

## 2024-07-30 ENCOUNTER — APPOINTMENT (OUTPATIENT)
Dept: PULMONOLOGY | Facility: CLINIC | Age: 59
End: 2024-07-30
Payer: COMMERCIAL

## 2024-07-30 VITALS
SYSTOLIC BLOOD PRESSURE: 130 MMHG | WEIGHT: 238 LBS | HEART RATE: 62 BPM | DIASTOLIC BLOOD PRESSURE: 82 MMHG | OXYGEN SATURATION: 99 % | BODY MASS INDEX: 38.25 KG/M2 | HEIGHT: 66 IN

## 2024-07-30 DIAGNOSIS — I26.99 OTHER PULMONARY EMBOLISM W/OUT ACUTE COR PULMONALE: ICD-10-CM

## 2024-07-30 DIAGNOSIS — I82.409 ACUTE EMBOLISM AND THROMBOSIS OF UNSPECIFIED DEEP VEINS OF UNSPECIFIED LOWER EXTREMITY: ICD-10-CM

## 2024-07-30 PROCEDURE — 99213 OFFICE O/P EST LOW 20 MIN: CPT

## 2024-07-30 NOTE — HISTORY OF PRESENT ILLNESS
[TextBox_4] : Recent hospital admission in February for PE and DVTs  Before that she had laparoscopic gallbladder surgery, recent travel as well CTA noted with segmental PE and LE bilateral DVTs  On room air  Cough is gone  Post nasal drip on nasal spray  RA and Sjogren following with rheum   CTA and duplex repeated and negative. On Eliquis 5 mg BID.

## 2024-07-30 NOTE — ASSESSMENT
[FreeTextEntry1] : Cough has resolved  Post nasal drip on PRN nasal sprays   PE and DVT  Unprovoked ?  On Eliquis and following with hematology  Will likely require at least 6 months of AC  Afterwards can consider half dose Eliquis for another 6 months  She is following with hematology either way CTA chest and LE duplex reviewed Both negative for VTE now   6 months follow up

## 2025-01-02 NOTE — ASU PATIENT PROFILE, ADULT - TEACHING/LEARNING OTHER LEARNERS
----- Message from Lianet Villanueva MD sent at 1/2/2025  8:25 AM CST -----  Please let her know her kidney cyst is stable in size. Will plan to follow up with another ultrasound in 6 months.    mother

## 2025-02-03 ENCOUNTER — APPOINTMENT (OUTPATIENT)
Dept: PULMONOLOGY | Facility: CLINIC | Age: 60
End: 2025-02-03
Payer: COMMERCIAL

## 2025-02-03 VITALS
RESPIRATION RATE: 14 BRPM | DIASTOLIC BLOOD PRESSURE: 80 MMHG | HEART RATE: 64 BPM | BODY MASS INDEX: 38.57 KG/M2 | HEIGHT: 66 IN | WEIGHT: 240 LBS | OXYGEN SATURATION: 99 % | SYSTOLIC BLOOD PRESSURE: 120 MMHG

## 2025-02-03 DIAGNOSIS — G47.33 OBSTRUCTIVE SLEEP APNEA (ADULT) (PEDIATRIC): ICD-10-CM

## 2025-02-03 DIAGNOSIS — I26.99 OTHER PULMONARY EMBOLISM W/OUT ACUTE COR PULMONALE: ICD-10-CM

## 2025-02-03 DIAGNOSIS — J45.20 MILD INTERMITTENT ASTHMA, UNCOMPLICATED: ICD-10-CM

## 2025-02-03 DIAGNOSIS — R09.82 POSTNASAL DRIP: ICD-10-CM

## 2025-02-03 PROCEDURE — G2211 COMPLEX E/M VISIT ADD ON: CPT | Mod: NC

## 2025-02-03 PROCEDURE — 99214 OFFICE O/P EST MOD 30 MIN: CPT

## 2025-02-07 NOTE — ED ADULT NURSE NOTE - DRUG PRE-SCREENING (DAST -1)
05/12/22 1400   Pain Assessment   Pain Assessment Tool 0-10   Pain Score No Pain   Restrictions/Precautions   Precautions Fall Risk;Bed/chair alarms; [place];Contact/isolation  (suprapubic cath)   Lifestyle   Autonomy "I don't think helped very much with that (transfer)"   Grooming   Findings Ivonne to complete various grooming tasks seated in WC at sink including hand washing, oral care, nail care  Pt demo's dec trunk control and dec UE strength and coordinatin  Bed-Chair Transfer   Type of Assistance Needed Physical assistance   Physical Assistance Level Total assistance   Comment Pt more fatigued this afternoon  SB to WC from EOB required MaxAx1 w/ SBA of second for safety  Noted tone in BLE and trunk in hyperextension w/ initial SB placement  Chair/Bed-to-Chair Transfer CARE Score 1   Cognition   Overall Cognitive Status WFL   Arousal/Participation Cooperative   Attention Within functional limits   Orientation Level Oriented X4   Memory Decreased short term memory   Following Commands Follows one step commands with increased time or repetition   Activity Tolerance   Activity Tolerance Patient limited by fatigue   Assessment   Treatment Assessment OT session focusing on fxnl transfer w/ SB and grooming tasks competed while seated in WC  Pt required MaxA this afternoon vs Ivonne this morning which may be due to her hx of MS and fatiguing as day goes on, however; pt continues to be adamant that she has more difficulty in morning vs afternoon  Will see pt for full time earlier in the day tomorrow to assess pt's fxnl abilities at different times of day  OT to continue to treat and follow established POC  Problem List Decreased strength;Decreased range of motion;Decreased endurance; Impaired balance;Decreased mobility; Decreased coordination; Impaired sensation; Impaired tone   Plan   Treatment/Interventions ADL retraining;Functional transfer training; Therapeutic exercise; Endurance training;Patient/family
training;Equipment eval/education; Compensatory technique education;Continued evaluation   OT Therapy Minutes   OT Time In 1400   OT Time Out 1430   OT Total Time (minutes) 30   OT Mode of treatment - Individual (minutes) 30   OT Mode of treatment - Concurrent (minutes) 0   OT Mode of treatment - Group (minutes) 0   OT Mode of treatment - Co-treat (minutes) 0   OT Mode of Treatment - Total time(minutes) 30 minutes   OT Cumulative Minutes 270   Therapy Time missed   Time missed?  No
Statement Selected
oral

## 2025-02-24 ENCOUNTER — APPOINTMENT (OUTPATIENT)
Dept: SLEEP CENTER | Facility: HOSPITAL | Age: 60
End: 2025-02-24
Payer: COMMERCIAL

## 2025-02-24 ENCOUNTER — OUTPATIENT (OUTPATIENT)
Dept: OUTPATIENT SERVICES | Facility: HOSPITAL | Age: 60
LOS: 1 days | Discharge: HOME | End: 2025-02-24
Payer: COMMERCIAL

## 2025-02-24 DIAGNOSIS — G47.33 OBSTRUCTIVE SLEEP APNEA (ADULT) (PEDIATRIC): ICD-10-CM

## 2025-02-24 DIAGNOSIS — Z98.891 HISTORY OF UTERINE SCAR FROM PREVIOUS SURGERY: Chronic | ICD-10-CM

## 2025-02-24 DIAGNOSIS — Z98.890 OTHER SPECIFIED POSTPROCEDURAL STATES: Chronic | ICD-10-CM

## 2025-02-24 DIAGNOSIS — Z90.49 ACQUIRED ABSENCE OF OTHER SPECIFIED PARTS OF DIGESTIVE TRACT: Chronic | ICD-10-CM

## 2025-02-24 PROCEDURE — 95800 SLP STDY UNATTENDED: CPT

## 2025-02-24 PROCEDURE — 95800 SLP STDY UNATTENDED: CPT | Mod: 26

## 2025-02-27 DIAGNOSIS — G47.33 OBSTRUCTIVE SLEEP APNEA (ADULT) (PEDIATRIC): ICD-10-CM

## 2025-03-02 NOTE — ED CDU PROVIDER DISPOSITION NOTE - INCLUDE COVID-19 DISCHARGE INSTRUCTIONS
Inpatient consult to Cardiology  Consult performed by: Kenenth Norwood MD  Consult ordered by: Odilon Khalil MD        History Of Present Illness:    Myrna Khan is a 63 y.o. female with history of HFimpEF (EF 55% July/2024, 35-40% in Aug/2023), CAD s/p CABG in 2023 (LIMA-LAD, APRIL-OM), PAD s/p L SFA stent (occluded in Nov/2023 w/ distal SFA reconstitution), renal artery stenosis (Sep/2023 US), carotid artery stenosis (Aug/2023 US), HTN, HLD, T2DM (A1c 14.6% Jan/2025) presenting with dry gangrene of LLE currently planned for LLE angiogram and possible intervention (possible extensive debridement vs amputation or revascularization via bypass) with vascular surgery.     Cardiology consulted for pre-op risk stratification.   Patient hemodynamically stable with no acute cardiac complaints. She denies any acute or remote chest pain episodes, palpitations, SOB, NIELSEN, orthopnea, PND, nausea, vomiting, diaphoresis, lightheadedness, dizziness, presyncopal symptoms, LE swelling, LOC.   No signs of acute ACS, active chest pain, malignant arrythmias, decompensated HF or volume overload or symptomatic valvular disease.   EKG showing normal sinus rhythm with no acute ischemic changes.    Cardiac imaging/work-up:  Echocardiogram 7/16/2024:   1. Left ventricular ejection fraction is low normal, by visual estimate at 55%.   2. Spectral Doppler shows an impaired relaxation pattern of left ventricular diastolic filling.   3. Abnormal septal motion consistent with post-operative status.   4. GLS is reduced at -11.9% with an apical sparing pattern suggestive of possible cardiac amyloid. LV systolic function is difficult to assess given that with swinging of the heart many images become foreshortened and LV appears better in some views than others. Overall does appear to be normal or low normal without regional wall motion abnormalities. Given the combination of thickened LV and RV walls with somewhat thickened valves and  pericardial effusion with reduced GLS with the pattern of apical sparing, would consider cardiac MRI to further assess for possible cardiac amyloid.   5. There is reduced right ventricular systolic function.   6. The left atrium is mildly dilated.   7. Mobile atrial septal aneurysm wtih an agitated saline contrast study that was negative for intracardiac shunt.   8. Mildly thickened MV leaflet tips with doming of the AML with only trace MR and mean MV gradient only 1.5mmHg.   9. Primary service notified of the findings at the time of reporting.  10. Compared with the prior exam from 8/26/2023 there has been improvement of the LV systolic function Previously there was moderatley reduced LV systolic function with apex having worst function. There has been interval surgical revascularization between the two studies. Today's exam was the [facility] echocadiogram to include GLS asessment and raised the possibility of cardiac amyloid.      Cardiac MRI 7/2024:  IMPRESSION:  1. The left ventricle is normal in size with low-normal systolic  function. LVEF 51%. There are no segmental wall motion abnormalities.  Quantitative values are as noted above.  2. Diffuse left ventricle hypertrophy measuring up to 1.8 cm. There  is extensive sub endocardial delayed enhancement with more prominent  involvement of the lateral wall. Findings are nonspecific but can be  seen in cardiac amyloidosis. Cannot exclude component of infarction  in the lateral wall.  3. Nonspecific mild mid myocardial delayed enhancement  interventricular septum at the RV insertion sites which can be due to  right heart volume or pressure overload.  4. Normal aortic, mitral, and tricuspid valve function. Aortic  regurgitant volume 1 ml. Aortic regurgitant fraction 3 %. Peak aortic  valve velocity 95 cm/sec.    NM PYP scan 7/2024:  IMPRESSION:  1. Negative amyloid SPECT heart study without evidence of TTR  amyloidosis.      Coronary angiogram  9/4/2023:  ____________________________________________________________________________________  CONCLUSIONS:  1. S/p uncomplicated R/L heart catheterization.  2. RHC access initially through right brachial vein with 6f sheath, however unable to advance Equipment. Switched to RIJ 6f sheath. LHC through right radial artery 6f sheath.  3. Severe 2 vessel disease involving mid LAD and mid LCx, FFR was done and positive in LAD (0.74) and LCx (0.69).  4. Right dominant circulation.  5. Heart team discussion regarding revascularization options.    Now status post CABG (LIMA-LAD, APRIL-OM)     Last Recorded Vitals:  Vitals:    03/01/25 2302 03/01/25 2356 03/02/25 0445 03/02/25 0722   BP: 172/80 157/70 167/75 176/78   BP Location: Right arm   Right arm   Patient Position: Sitting   Lying   Pulse: 82 68 80 79   Resp: 17 17 17 16   Temp: 36 °C (96.8 °F) 36.3 °C (97.3 °F) 36 °C (96.8 °F) 36.5 °C (97.7 °F)   TempSrc:    Temporal   SpO2: 99% 100% 100% 98%   Weight:       Height:           Last Labs:  CBC - 3/2/2025:  5:04 AM  10.2 10.1 412    31.1      CMP - 3/2/2025:  5:05 AM  8.7 7.8 19 --- 0.3   2.9 2.9 17 176      PTT - 3/1/2025:  6:33 AM  1.2   13.1 35     Troponin I, High Sensitivity   Date/Time Value Ref Range Status   01/11/2024 03:37 PM 22 0 - 34 ng/L Final     Troponin I, High Sensitivity (CMC)   Date/Time Value Ref Range Status   02/28/2025 06:01 PM 5 0 - 34 ng/L Final   01/18/2025 06:12 AM 14 0 - 34 ng/L Final   01/18/2025 04:50 AM 14 0 - 34 ng/L Final     BNP   Date/Time Value Ref Range Status   02/28/2025 06:01  (H) 0 - 99 pg/mL Final   07/15/2024 11:55  (H) 0 - 99 pg/mL Final     Hemoglobin A1C   Date/Time Value Ref Range Status   01/09/2025 06:34 AM 14.6 (H) See comment % Final   07/15/2024 11:55 AM 15.2 (H) see below % Final     Comment:     Hemoglobin A1c is >15%. Marked elevations in HbA1c are commonly due to poor glycemic control in diabetic patients. Rarely, hemoglobin variants may cause false  elevation of HbA1c that is discordant with glycemic control status.      LDL Calculated   Date/Time Value Ref Range Status   07/16/2024 04:58 AM 56 <=99 mg/dL Final     Comment:                                 Near   Borderline      AGE      Desirable  Optimal    High     High     Very High     0-19 Y     0 - 109     ---    110-129   >/= 130     ----    20-24 Y     0 - 119     ---    120-159   >/= 160     ----      >24 Y     0 -  99   100-129  130-159   160-189     >/=190       VLDL   Date/Time Value Ref Range Status   07/16/2024 04:58 AM 15 0 - 40 mg/dL Final      Last I/O:  I/O last 3 completed shifts:  In: 650 (15.2 mL/kg) [IV Piggyback:650]  Out: 400 (9.4 mL/kg) [Urine:400 (0.3 mL/kg/hr)]  Weight: 42.6 kg       Past Medical History:  She has a past medical history of CAD (coronary artery disease), Carotid artery stenosis, Diabetes mellitus (Multi), Heart disease, Hypertension, NSTEMI (non-ST elevated myocardial infarction) (Multi), PAD (peripheral artery disease) (CMS-HCC), and Renal artery stenosis (CMS-HCC).    Past Surgical History:  She has a past surgical history that includes Coronary artery bypass graft and Femoral artery stent (Left).      Social History:  She reports that she has been smoking cigarettes. She started smoking about 30 years ago. She has a 30.2 pack-year smoking history. She has never used smokeless tobacco. She reports current alcohol use. She reports that she does not use drugs.    Family History:  Family History   Problem Relation Name Age of Onset    Peripheral vascular disease Mother      Hypertension Mother      Diabetes type II Mother      Hypertension Sister      Diabetes type II Sister          Allergies:  Lisinopril, Amoxicillin, and Losartan    Inpatient Medications:  Scheduled medications   Medication Dose Route Frequency    acetaminophen  650 mg oral q8h    amLODIPine  10 mg oral Daily    aspirin  81 mg oral Daily    atorvastatin  80 mg oral Nightly    carvedilol  25 mg oral  "BID    cefepime  2 g intravenous q8h    clopidogrel  75 mg oral Daily    heparin (porcine)  5,000 Units subcutaneous q8h BOLIVAR    insulin glargine  8 Units subcutaneous Nightly    insulin lispro  0-5 Units subcutaneous TID AC    isosorbide mononitrate ER  60 mg oral Daily    melatonin  10 mg oral Nightly    ranolazine  500 mg oral BID    vancomycin  1,250 mg intravenous q24h     PRN medications   Medication    dextrose    dextrose    glucagon    glucagon    OLANZapine    oxyCODONE    polyethylene glycol    QUEtiapine    vancomycin     Continuous Medications   Medication Dose Last Rate     Outpatient Medications:  Current Outpatient Medications   Medication Instructions    acetaminophen (TYLENOL) 500 mg, oral, Every 6 hours PRN, Take per directed    amLODIPine (Norvasc) 10 mg tablet TAKE 1 TABLET BY MOUTH ONCE DAILY    aspirin 81 mg, oral, Daily    atorvastatin (Lipitor) 80 mg tablet TAKE 1 TABLET BY MOUTH ONCE DAILY    BD Ultra-Fine Micro Pen Needle 32 gauge x 1/4\" needle     blood sugar diagnostic strip Use 1 strip to check blood sugar 3 times a day with meals.    CALCIUM CITRATE ORAL 1 tablet, oral, Daily    carvedilol (Coreg) 25 mg tablet TAKE 1 TABLET BY MOUTH TWO TIMES A DAY    clopidogrel (PLAVIX) 75 mg, oral, Daily RT    diclofenac sodium (VOLTAREN) 4 g, Topical, 4 times daily PRN    Easy Touch Alcohol Prep Pads pads, medicated     Farxiga 10 mg, oral, Daily with breakfast    gabapentin (NEURONTIN) 300 mg, oral, Daily    hydrALAZINE (APRESOLINE) 10 mg, oral, 2 times daily    insulin glargine (LANTUS) 10 Units, subcutaneous, Nightly, Take as directed per insulin instructions.    insulin lispro 0-5 Units, subcutaneous, 3 times daily (morning, midday, late afternoon), 0 unit(s) if BG ; 1 unit(s) if -200; 2 unit(s) if -250; 3 unit(s) if -300; 4 unit(s) if -350; 5 unit(s) if -400    isosorbide mononitrate ER (Imdur) 60 mg 24 hr tablet TAKE 1 TABLET BY MOUTH ONCE DAILY    lancets " misc Use three times a day to test blood sugar w/ meals    metFORMIN XR (Glucophage-XR) 500 mg 24 hr tablet TAKE 2 TABLETS BY MOUTH ONCE DAILY    multivitamin with minerals tablet 1 tablet, oral, Daily    oxyCODONE (OXY-IR) 5 mg, oral, Every 6 hours PRN    ranolazine (RANEXA) 500 mg, oral, 2 times daily, Do not crush, chew, or split.       Physical Exam:  General: NAD, AOx3  HEENT: EOMI, MMM, no LAD, no thyroid nodule or enlargement.   Neck: -JVD, supple  Cardiac: Normal S, S2. No murmurs noted  Lungs:  Good bilateral air entry, clear lungs bilaterally  Abdomen: Soft, non-tender, Non-distended   Extremities: No LE edema. LLE dry gangrene   Neuro: AOx3, no apparent focal deficits       Assessment/Plan   Myrna Khan is a 63 y.o. female with history of HFimpEF (EF 55% July/2024, 35-40% in Aug/2023), CAD s/p CABG in 2023 (LIMA-LAD, APRIL-OM), PAD s/p L SFA stent (occluded in Nov/2023 w/ distal SFA reconstitution), renal artery stenosis (Sep/2023 US), carotid artery stenosis (Aug/2023 US), HTN, HLD, T2DM (A1c 14.6% Jan/2025) presenting with dry gangrene of LLE currently planned for LLE angiogram and possible intervention (possible extensive debridement vs amputation or revascularization via bypass) with vascular surgery.     Cardiology consulted for pre-op risk stratification.     Most recent echocardiogram in July 2024 showing features suggestive of cardiac amyloidosis (apical sparring, LVH) prompting further work-up with cardiac MRI that showed extensive sub endocardial delayed enhancement with more prominent involvement of the lateral wall -->Findings that are nonspecific but can be seen in cardiac amyloidosis.   A technetium PYP scan was done as well and did not show any uptake -->negative for TTR amyloidosis.   A urine immunofixation electrophoresis was done but does not seem that a serum immunofixation and serum free light chains were collected and analysed at the time. --> recommend outpatient follow up and  completion of her evaluation.     Patient hemodynamically stable with no acute cardiac complaints. She denies any acute or remote chest pain episodes, palpitations, SOB, NIELSEN, orthopnea, PND, nausea, vomiting, diaphoresis, lightheadedness, dizziness, presyncopal symptoms, LE swelling, LOC.   No signs of acute ACS, active chest pain, malignant arrythmias, decompensated HF or volume overload or symptomatic valvular disease.     RCRI 3 --> 15% of MACE.  Patient with RCRI of 3, has poor METS mainly related to her PAD and is going to undergo an intermediate/high risk surgery and has history of extensive atherosclerotic disease ---> as such, would recommend repeating a formal echocardiogram in AM to assess LV function.   If no changes on echocardiogram and patient continues to have preserved/normal systolic function, can proceed with surgery with no further cardiac work-up warranted.       Code Status:  Full Code    I spent 60 minutes in the professional and overall care of this patient.        Kenneth Norwood MD   <-------- Click here to INCLUDE CoVID-19 Discharge Instructions

## 2025-03-19 NOTE — ED CDU PROVIDER DISPOSITION NOTE - NSDCPRINTRESULTS_ED_ALL_ED
Patient requests all Lab, Cardiology, and Radiology Results on their Discharge Instructions non-distended

## 2025-03-23 ENCOUNTER — EMERGENCY (EMERGENCY)
Facility: HOSPITAL | Age: 60
LOS: 0 days | Discharge: HOME | End: 2025-03-23
Attending: STUDENT IN AN ORGANIZED HEALTH CARE EDUCATION/TRAINING PROGRAM
Payer: COMMERCIAL

## 2025-03-23 VITALS
DIASTOLIC BLOOD PRESSURE: 92 MMHG | RESPIRATION RATE: 17 BRPM | TEMPERATURE: 99 F | WEIGHT: 240.08 LBS | HEART RATE: 66 BPM | OXYGEN SATURATION: 100 % | SYSTOLIC BLOOD PRESSURE: 163 MMHG

## 2025-03-23 DIAGNOSIS — Z98.890 OTHER SPECIFIED POSTPROCEDURAL STATES: Chronic | ICD-10-CM

## 2025-03-23 DIAGNOSIS — E78.5 HYPERLIPIDEMIA, UNSPECIFIED: ICD-10-CM

## 2025-03-23 DIAGNOSIS — Z79.01 LONG TERM (CURRENT) USE OF ANTICOAGULANTS: ICD-10-CM

## 2025-03-23 DIAGNOSIS — Z98.891 HISTORY OF UTERINE SCAR FROM PREVIOUS SURGERY: Chronic | ICD-10-CM

## 2025-03-23 DIAGNOSIS — Z90.49 ACQUIRED ABSENCE OF OTHER SPECIFIED PARTS OF DIGESTIVE TRACT: Chronic | ICD-10-CM

## 2025-03-23 DIAGNOSIS — Z86.718 PERSONAL HISTORY OF OTHER VENOUS THROMBOSIS AND EMBOLISM: ICD-10-CM

## 2025-03-23 DIAGNOSIS — R51.9 HEADACHE, UNSPECIFIED: ICD-10-CM

## 2025-03-23 DIAGNOSIS — I10 ESSENTIAL (PRIMARY) HYPERTENSION: ICD-10-CM

## 2025-03-23 DIAGNOSIS — R42 DIZZINESS AND GIDDINESS: ICD-10-CM

## 2025-03-23 DIAGNOSIS — Z86.69 PERSONAL HISTORY OF OTHER DISEASES OF THE NERVOUS SYSTEM AND SENSE ORGANS: ICD-10-CM

## 2025-03-23 DIAGNOSIS — Z86.711 PERSONAL HISTORY OF PULMONARY EMBOLISM: ICD-10-CM

## 2025-03-23 LAB
ALBUMIN SERPL ELPH-MCNC: 4.2 G/DL — SIGNIFICANT CHANGE UP (ref 3.5–5.2)
ALP SERPL-CCNC: 98 U/L — SIGNIFICANT CHANGE UP (ref 30–115)
ALT FLD-CCNC: 21 U/L — SIGNIFICANT CHANGE UP (ref 0–41)
ANION GAP SERPL CALC-SCNC: 13 MMOL/L — SIGNIFICANT CHANGE UP (ref 7–14)
AST SERPL-CCNC: 34 U/L — SIGNIFICANT CHANGE UP (ref 0–41)
BASOPHILS # BLD AUTO: 0.04 K/UL — SIGNIFICANT CHANGE UP (ref 0–0.2)
BASOPHILS NFR BLD AUTO: 1.1 % — HIGH (ref 0–1)
BILIRUB SERPL-MCNC: 1.8 MG/DL — HIGH (ref 0.2–1.2)
BUN SERPL-MCNC: 10 MG/DL — SIGNIFICANT CHANGE UP (ref 10–20)
CALCIUM SERPL-MCNC: 9.4 MG/DL — SIGNIFICANT CHANGE UP (ref 8.4–10.5)
CHLORIDE SERPL-SCNC: 103 MMOL/L — SIGNIFICANT CHANGE UP (ref 98–110)
CO2 SERPL-SCNC: 22 MMOL/L — SIGNIFICANT CHANGE UP (ref 17–32)
CREAT SERPL-MCNC: 0.8 MG/DL — SIGNIFICANT CHANGE UP (ref 0.7–1.5)
EGFR: 84 ML/MIN/1.73M2 — SIGNIFICANT CHANGE UP
EGFR: 84 ML/MIN/1.73M2 — SIGNIFICANT CHANGE UP
EOSINOPHIL # BLD AUTO: 0.12 K/UL — SIGNIFICANT CHANGE UP (ref 0–0.7)
EOSINOPHIL NFR BLD AUTO: 3.3 % — SIGNIFICANT CHANGE UP (ref 0–8)
GLUCOSE SERPL-MCNC: 92 MG/DL — SIGNIFICANT CHANGE UP (ref 70–99)
HCT VFR BLD CALC: 39.4 % — SIGNIFICANT CHANGE UP (ref 37–47)
HGB BLD-MCNC: 12.6 G/DL — SIGNIFICANT CHANGE UP (ref 12–16)
IMM GRANULOCYTES NFR BLD AUTO: 0.3 % — SIGNIFICANT CHANGE UP (ref 0.1–0.3)
LYMPHOCYTES # BLD AUTO: 1.29 K/UL — SIGNIFICANT CHANGE UP (ref 1.2–3.4)
LYMPHOCYTES # BLD AUTO: 35 % — SIGNIFICANT CHANGE UP (ref 20.5–51.1)
MCHC RBC-ENTMCNC: 26.6 PG — LOW (ref 27–31)
MCHC RBC-ENTMCNC: 32 G/DL — SIGNIFICANT CHANGE UP (ref 32–37)
MCV RBC AUTO: 83.3 FL — SIGNIFICANT CHANGE UP (ref 81–99)
MONOCYTES # BLD AUTO: 0.28 K/UL — SIGNIFICANT CHANGE UP (ref 0.1–0.6)
MONOCYTES NFR BLD AUTO: 7.6 % — SIGNIFICANT CHANGE UP (ref 1.7–9.3)
NEUTROPHILS # BLD AUTO: 1.95 K/UL — SIGNIFICANT CHANGE UP (ref 1.4–6.5)
NEUTROPHILS NFR BLD AUTO: 52.7 % — SIGNIFICANT CHANGE UP (ref 42.2–75.2)
NRBC BLD AUTO-RTO: 0 /100 WBCS — SIGNIFICANT CHANGE UP (ref 0–0)
PLATELET # BLD AUTO: 250 K/UL — SIGNIFICANT CHANGE UP (ref 130–400)
PMV BLD: 10.4 FL — SIGNIFICANT CHANGE UP (ref 7.4–10.4)
POTASSIUM SERPL-MCNC: 5.2 MMOL/L — HIGH (ref 3.5–5)
POTASSIUM SERPL-SCNC: 5.2 MMOL/L — HIGH (ref 3.5–5)
PROT SERPL-MCNC: 7.6 G/DL — SIGNIFICANT CHANGE UP (ref 6–8)
RBC # BLD: 4.73 M/UL — SIGNIFICANT CHANGE UP (ref 4.2–5.4)
RBC # FLD: 15.9 % — HIGH (ref 11.5–14.5)
SODIUM SERPL-SCNC: 138 MMOL/L — SIGNIFICANT CHANGE UP (ref 135–146)
WBC # BLD: 3.69 K/UL — LOW (ref 4.8–10.8)
WBC # FLD AUTO: 3.69 K/UL — LOW (ref 4.8–10.8)

## 2025-03-23 PROCEDURE — 70450 CT HEAD/BRAIN W/O DYE: CPT | Mod: 26,59

## 2025-03-23 PROCEDURE — 96374 THER/PROPH/DIAG INJ IV PUSH: CPT | Mod: 25

## 2025-03-23 PROCEDURE — 70450 CT HEAD/BRAIN W/O DYE: CPT | Mod: MC

## 2025-03-23 PROCEDURE — 82962 GLUCOSE BLOOD TEST: CPT

## 2025-03-23 PROCEDURE — 93010 ELECTROCARDIOGRAM REPORT: CPT

## 2025-03-23 PROCEDURE — 93005 ELECTROCARDIOGRAM TRACING: CPT

## 2025-03-23 PROCEDURE — 70498 CT ANGIOGRAPHY NECK: CPT | Mod: 26

## 2025-03-23 PROCEDURE — 70496 CT ANGIOGRAPHY HEAD: CPT | Mod: MC

## 2025-03-23 PROCEDURE — 99285 EMERGENCY DEPT VISIT HI MDM: CPT

## 2025-03-23 PROCEDURE — 80053 COMPREHEN METABOLIC PANEL: CPT

## 2025-03-23 PROCEDURE — 70496 CT ANGIOGRAPHY HEAD: CPT | Mod: 26

## 2025-03-23 PROCEDURE — 99285 EMERGENCY DEPT VISIT HI MDM: CPT | Mod: 25

## 2025-03-23 PROCEDURE — 85025 COMPLETE CBC W/AUTO DIFF WBC: CPT

## 2025-03-23 PROCEDURE — 70498 CT ANGIOGRAPHY NECK: CPT | Mod: MC

## 2025-03-23 PROCEDURE — 36415 COLL VENOUS BLD VENIPUNCTURE: CPT

## 2025-03-23 RX ORDER — SODIUM CHLORIDE 9 G/1000ML
1000 INJECTION, SOLUTION INTRAVENOUS ONCE
Refills: 0 | Status: COMPLETED | OUTPATIENT
Start: 2025-03-23 | End: 2025-03-23

## 2025-03-23 RX ORDER — METOCLOPRAMIDE HCL 10 MG
10 TABLET ORAL ONCE
Refills: 0 | Status: COMPLETED | OUTPATIENT
Start: 2025-03-23 | End: 2025-03-23

## 2025-03-23 RX ADMIN — SODIUM CHLORIDE 1000 MILLILITER(S): 9 INJECTION, SOLUTION INTRAVENOUS at 12:27

## 2025-03-23 RX ADMIN — Medication 10 MILLIGRAM(S): at 12:27

## 2025-03-23 NOTE — ED ADULT NURSE NOTE - CHIEF COMPLAINT QUOTE
Patient states she was walking out of coffee shop when you started to feel light headed and as if the ground was moving . Pt complaining of feeling light headed and nauseous. Glucose 98 in triage

## 2025-03-23 NOTE — ED PROVIDER NOTE - PHYSICAL EXAMINATION
VITAL SIGNS: I have reviewed nursing notes and confirm.  CONSTITUTIONAL: Well-developed; well-nourished  SKIN: skin exam is warm and dry, no acute rash.    HEAD: Normocephalic; atraumatic.  EYES:  conjunctiva and sclera clear.  ENT: No nasal discharge; airway clear.  CARD: S1, S2 normal; no murmurs, gallops, or rubs. Regular rate and rhythm.   RESP: No wheezes, rales or rhonchi.  ABD: Normal bowel sounds; soft; non-distended; non-tender  EXT: Normal ROM.  No clubbing, cyanosis or edema.   NEURO: Finger-nose intact, no dysmetria, muscle strength 5 out of 5 throughout, ambulating steady gait Alert, oriented, grossly unremarkable

## 2025-03-23 NOTE — ED PROVIDER NOTE - PATIENT PORTAL LINK FT
You can access the FollowMyHealth Patient Portal offered by [facility] by registering at the following website: [email or web address]. By joining [employer]’s FollowMyHealth portal, you will also be able to view your health information using other applications (apps) compatible with our system.

## 2025-03-23 NOTE — ED PROVIDER NOTE - CLINICAL SUMMARY MEDICAL DECISION MAKING FREE TEXT BOX
.    59-year-old female, PMH vestibular migraine, DVT, PE on Eliquis, HTN, HLD, p/w vertiginous-like symptoms of lightheadedness, onset today around 930 this morning while walking outside.  Symptoms have gradually improved.  + Headache which is also improved.  No chest pain, shortness of breath, focal weakness or numbness, vomiting diarrhea or recent illness.    Exam as noted above, patient is NAD, regular rate and rhythm, no murmur, neuroexam is nonfocal    Differential diagnose includes but not limited to vestibular migraine, migraine, CVA, electrode abnormality, arrhythmia    All available lab tests, imaging tests, and EKGs independently reviewed and interpreted by [facility].      . .    59-year-old female, PMH vestibular migraine, DVT, PE on Eliquis, HTN, HLD, p/w vertiginous-like symptoms of lightheadedness, onset today around 930 this morning while walking outside.  Symptoms have gradually improved.  + Headache which is also improved.  No chest pain, shortness of breath, focal weakness or numbness, vomiting diarrhea or recent illness.    Exam as noted above, patient is NAD, regular rate and rhythm, no murmur, neuroexam is nonfocal    Differential diagnose includes but not limited to vestibular migraine, migraine, CVA, electrode abnormality, arrhythmia    All available lab tests, imaging tests, and EKGs independently reviewed and interpreted by [facility].  CT imaging shows no acute bleed, mass or midline shift, no stenosis or aneurysm.  Labs reviewed.  EKG normal sinus rhythm, rate 60 normal axis no STEMI    Patient felt better after IV fluid in emergency department, another medication.  Dizziness resolved.    Impression dizziness; consider known vestibular migraine  Discussed with patient diagnostic uncertainty, need for outpatient follow-up with neurology, supportive care and return precautions.  Patient understand all results and plan of care and is comfortable discharge.  DC home    .        .

## 2025-03-23 NOTE — ED ADULT TRIAGE NOTE - CHIEF COMPLAINT QUOTE
Patient states she was walking out of coffee shop when you started to feel light headed and as if the ground was moving . Pt complaining of feeling light headed and nauseous. Glucose 98 in tiriage Patient states she was walking out of coffee shop when you started to feel light headed and as if the ground was moving . Pt complaining of feeling light headed and nauseous. Glucose 98 in triage

## 2025-03-23 NOTE — ED ADULT NURSE NOTE - NSFALLHARMRISKINTERV_ED_ALL_ED
Assistance OOB with selected safe patient handling equipment if applicable/Assistance with ambulation/Communicate risk of Fall with Harm to all staff, patient, and family/Encourage patient to sit up slowly, dangle for a short time, stand at bedside before walking/Monitor gait and stability/Orthostatic vital signs/Provide visual cue: red socks, yellow wristband, yellow gown, etc/Reinforce activity limits and safety measures with patient and family/Bed in lowest position, wheels locked, appropriate side rails in place/Call bell, personal items and telephone in reach/Instruct patient to call for assistance before getting out of bed/chair/stretcher/Non-slip footwear applied when patient is off stretcher/[place] to call system/Physically safe environment - no spills, clutter or unnecessary equipment/Purposeful Proactive Rounding/Room/bathroom lighting operational, light cord in reach

## 2025-03-23 NOTE — ED ADULT NURSE NOTE - NSFALLFACTORS_ED_ALL_ED
[FreeTextEntry1] : 50 y/o M pt with:\par \par 1. Hx of T2DM with multiple complications.\par Pt recently visited his nephrologist Dr. Grace; pt has recently started sodium bicarbonate and iron supplementations. Pt is focused on his DM management and he has occasional hypoglycemic episodes. Pt is recommended to reduce Lantus to 12u and to continue current prandial insulin dosage. Pt is referred to see a podiatrist and ophthalmologist.\par \par F/U in 4 months [Importance of Diet and Exercise] : importance of diet and exercise to improve glycemic control, achieve weight loss and improve cardiovascular health [Self Monitoring of Blood Glucose] : self monitoring of blood glucose Dizziness

## 2025-03-23 NOTE — ED ADULT NURSE NOTE - OBJECTIVE STATEMENT
pt a/ox4, c/o dizziness and headache. denies cardiac hx, states she did not fall but felt the ground moving. pt states she takes eliquis and statins, but has no pertinent med hx. pt denies chest pain or SOB

## 2025-03-23 NOTE — ED PROVIDER NOTE - OBJECTIVE STATEMENT
Pt is a 59-year-old female history of vestibular migraines, hypertension, hyperlipidemia, PE on Eliquis here for evaluation of lightheadedness that began around 930 this morning.  Patient describes the sensation as if the floor was moving.  Patient admits to associated gradual onset, nonexertional headache.  Patient denies vertigo, vision changes, weakness, numbness, fever, chills, head trauma, LOC, chest pain, shortness of breath

## 2025-05-05 ENCOUNTER — APPOINTMENT (OUTPATIENT)
Dept: PULMONOLOGY | Facility: CLINIC | Age: 60
End: 2025-05-05
Payer: COMMERCIAL

## 2025-05-05 VITALS
DIASTOLIC BLOOD PRESSURE: 64 MMHG | HEART RATE: 60 BPM | RESPIRATION RATE: 14 BRPM | WEIGHT: 240 LBS | OXYGEN SATURATION: 99 % | BODY MASS INDEX: 38.74 KG/M2 | SYSTOLIC BLOOD PRESSURE: 120 MMHG

## 2025-05-05 DIAGNOSIS — R09.82 POSTNASAL DRIP: ICD-10-CM

## 2025-05-05 DIAGNOSIS — J45.20 MILD INTERMITTENT ASTHMA, UNCOMPLICATED: ICD-10-CM

## 2025-05-05 DIAGNOSIS — R05.9 COUGH, UNSPECIFIED: ICD-10-CM

## 2025-05-05 DIAGNOSIS — G47.33 OBSTRUCTIVE SLEEP APNEA (ADULT) (PEDIATRIC): ICD-10-CM

## 2025-05-05 PROCEDURE — G2211 COMPLEX E/M VISIT ADD ON: CPT | Mod: NC

## 2025-05-05 PROCEDURE — 99214 OFFICE O/P EST MOD 30 MIN: CPT

## 2025-05-05 RX ORDER — ALBUTEROL SULFATE 90 UG/1
108 (90 BASE) INHALANT RESPIRATORY (INHALATION)
Qty: 1 | Refills: 1 | Status: ACTIVE | COMMUNITY
Start: 2025-05-05 | End: 1900-01-01

## 2025-05-05 RX ORDER — MONTELUKAST 10 MG/1
10 TABLET, FILM COATED ORAL
Qty: 30 | Refills: 1 | Status: ACTIVE | COMMUNITY
Start: 2025-05-05 | End: 1900-01-01

## 2025-05-05 RX ORDER — AZELASTINE 137 UG/1
137 SPRAY, METERED NASAL TWICE DAILY
Qty: 1 | Refills: 1 | Status: ACTIVE | COMMUNITY
Start: 2025-05-05 | End: 1900-01-01

## 2025-06-13 ENCOUNTER — EMERGENCY (EMERGENCY)
Facility: HOSPITAL | Age: 60
LOS: 0 days | Discharge: HOME | End: 2025-06-14
Attending: EMERGENCY MEDICINE
Payer: COMMERCIAL

## 2025-06-13 VITALS
WEIGHT: 240.08 LBS | RESPIRATION RATE: 16 BRPM | DIASTOLIC BLOOD PRESSURE: 102 MMHG | SYSTOLIC BLOOD PRESSURE: 164 MMHG | HEART RATE: 89 BPM | OXYGEN SATURATION: 100 % | TEMPERATURE: 98 F

## 2025-06-13 DIAGNOSIS — Z98.890 OTHER SPECIFIED POSTPROCEDURAL STATES: Chronic | ICD-10-CM

## 2025-06-13 DIAGNOSIS — Z90.49 ACQUIRED ABSENCE OF OTHER SPECIFIED PARTS OF DIGESTIVE TRACT: Chronic | ICD-10-CM

## 2025-06-13 DIAGNOSIS — Z98.891 HISTORY OF UTERINE SCAR FROM PREVIOUS SURGERY: Chronic | ICD-10-CM

## 2025-06-13 PROCEDURE — 84484 ASSAY OF TROPONIN QUANT: CPT

## 2025-06-13 PROCEDURE — 71275 CT ANGIOGRAPHY CHEST: CPT

## 2025-06-13 PROCEDURE — 80053 COMPREHEN METABOLIC PANEL: CPT

## 2025-06-13 PROCEDURE — 36415 COLL VENOUS BLD VENIPUNCTURE: CPT

## 2025-06-13 PROCEDURE — 83880 ASSAY OF NATRIURETIC PEPTIDE: CPT

## 2025-06-13 PROCEDURE — 99285 EMERGENCY DEPT VISIT HI MDM: CPT

## 2025-06-13 PROCEDURE — 99285 EMERGENCY DEPT VISIT HI MDM: CPT | Mod: 25

## 2025-06-13 PROCEDURE — 85025 COMPLETE CBC W/AUTO DIFF WBC: CPT

## 2025-06-13 PROCEDURE — 71046 X-RAY EXAM CHEST 2 VIEWS: CPT

## 2025-06-13 PROCEDURE — 93005 ELECTROCARDIOGRAM TRACING: CPT

## 2025-06-13 PROCEDURE — 94640 AIRWAY INHALATION TREATMENT: CPT

## 2025-06-14 LAB
ALBUMIN SERPL ELPH-MCNC: 4.8 G/DL — SIGNIFICANT CHANGE UP (ref 3.5–5.2)
ALP SERPL-CCNC: 103 U/L — SIGNIFICANT CHANGE UP (ref 30–115)
ALT FLD-CCNC: 21 U/L — SIGNIFICANT CHANGE UP (ref 0–41)
ANION GAP SERPL CALC-SCNC: 11 MMOL/L — SIGNIFICANT CHANGE UP (ref 7–14)
AST SERPL-CCNC: 23 U/L — SIGNIFICANT CHANGE UP (ref 0–41)
BASOPHILS # BLD AUTO: 0.04 K/UL — SIGNIFICANT CHANGE UP (ref 0–0.2)
BASOPHILS NFR BLD AUTO: 0.8 % — SIGNIFICANT CHANGE UP (ref 0–1)
BILIRUB SERPL-MCNC: 0.9 MG/DL — SIGNIFICANT CHANGE UP (ref 0.2–1.2)
BUN SERPL-MCNC: 17 MG/DL — SIGNIFICANT CHANGE UP (ref 10–20)
CALCIUM SERPL-MCNC: 9.6 MG/DL — SIGNIFICANT CHANGE UP (ref 8.4–10.5)
CHLORIDE SERPL-SCNC: 104 MMOL/L — SIGNIFICANT CHANGE UP (ref 98–110)
CO2 SERPL-SCNC: 25 MMOL/L — SIGNIFICANT CHANGE UP (ref 17–32)
CREAT SERPL-MCNC: 0.8 MG/DL — SIGNIFICANT CHANGE UP (ref 0.7–1.5)
EGFR: 84 ML/MIN/1.73M2 — SIGNIFICANT CHANGE UP
EGFR: 84 ML/MIN/1.73M2 — SIGNIFICANT CHANGE UP
EOSINOPHIL # BLD AUTO: 0.24 K/UL — SIGNIFICANT CHANGE UP (ref 0–0.7)
EOSINOPHIL NFR BLD AUTO: 4.8 % — SIGNIFICANT CHANGE UP (ref 0–8)
GLUCOSE SERPL-MCNC: 98 MG/DL — SIGNIFICANT CHANGE UP (ref 70–99)
HCT VFR BLD CALC: 38.3 % — SIGNIFICANT CHANGE UP (ref 37–47)
HGB BLD-MCNC: 12.3 G/DL — SIGNIFICANT CHANGE UP (ref 12–16)
IMM GRANULOCYTES NFR BLD AUTO: 0.2 % — SIGNIFICANT CHANGE UP (ref 0.1–0.3)
LYMPHOCYTES # BLD AUTO: 1.76 K/UL — SIGNIFICANT CHANGE UP (ref 1.2–3.4)
LYMPHOCYTES # BLD AUTO: 35.6 % — SIGNIFICANT CHANGE UP (ref 20.5–51.1)
MCHC RBC-ENTMCNC: 26.7 PG — LOW (ref 27–31)
MCHC RBC-ENTMCNC: 32.1 G/DL — SIGNIFICANT CHANGE UP (ref 32–37)
MCV RBC AUTO: 83.1 FL — SIGNIFICANT CHANGE UP (ref 81–99)
MONOCYTES # BLD AUTO: 0.39 K/UL — SIGNIFICANT CHANGE UP (ref 0.1–0.6)
MONOCYTES NFR BLD AUTO: 7.9 % — SIGNIFICANT CHANGE UP (ref 1.7–9.3)
NEUTROPHILS # BLD AUTO: 2.51 K/UL — SIGNIFICANT CHANGE UP (ref 1.4–6.5)
NEUTROPHILS NFR BLD AUTO: 50.7 % — SIGNIFICANT CHANGE UP (ref 42.2–75.2)
NRBC BLD AUTO-RTO: 0 /100 WBCS — SIGNIFICANT CHANGE UP (ref 0–0)
NT-PROBNP SERPL-SCNC: <36 PG/ML — SIGNIFICANT CHANGE UP (ref 0–300)
PLATELET # BLD AUTO: 282 K/UL — SIGNIFICANT CHANGE UP (ref 130–400)
PMV BLD: 10.6 FL — HIGH (ref 7.4–10.4)
POTASSIUM SERPL-MCNC: 4.4 MMOL/L — SIGNIFICANT CHANGE UP (ref 3.5–5)
POTASSIUM SERPL-SCNC: 4.4 MMOL/L — SIGNIFICANT CHANGE UP (ref 3.5–5)
PROT SERPL-MCNC: 7.4 G/DL — SIGNIFICANT CHANGE UP (ref 6–8)
RBC # BLD: 4.61 M/UL — SIGNIFICANT CHANGE UP (ref 4.2–5.4)
RBC # FLD: 15.7 % — HIGH (ref 11.5–14.5)
SODIUM SERPL-SCNC: 140 MMOL/L — SIGNIFICANT CHANGE UP (ref 135–146)
TROPONIN T, HIGH SENSITIVITY RESULT: 6 NG/L — SIGNIFICANT CHANGE UP (ref 6–13)
TROPONIN T, HIGH SENSITIVITY RESULT: <6 NG/L — SIGNIFICANT CHANGE UP (ref 6–13)
WBC # BLD: 4.95 K/UL — SIGNIFICANT CHANGE UP (ref 4.8–10.8)
WBC # FLD AUTO: 4.95 K/UL — SIGNIFICANT CHANGE UP (ref 4.8–10.8)

## 2025-06-14 PROCEDURE — 71046 X-RAY EXAM CHEST 2 VIEWS: CPT | Mod: 26

## 2025-06-14 PROCEDURE — 71275 CT ANGIOGRAPHY CHEST: CPT | Mod: 26

## 2025-06-14 PROCEDURE — 93010 ELECTROCARDIOGRAM REPORT: CPT

## 2025-06-14 RX ORDER — SODIUM CHLORIDE 9 G/1000ML
500 INJECTION, SOLUTION INTRAVENOUS ONCE
Refills: 0 | Status: COMPLETED | OUTPATIENT
Start: 2025-06-14 | End: 2025-06-14

## 2025-06-14 RX ORDER — BENZONATATE 100 MG
100 CAPSULE ORAL ONCE
Refills: 0 | Status: COMPLETED | OUTPATIENT
Start: 2025-06-14 | End: 2025-06-14

## 2025-06-14 RX ORDER — DEXAMETHASONE 0.5 MG/1
10 TABLET ORAL ONCE
Refills: 0 | Status: COMPLETED | OUTPATIENT
Start: 2025-06-14 | End: 2025-06-14

## 2025-06-14 RX ADMIN — DEXAMETHASONE 10 MILLIGRAM(S): 0.5 TABLET ORAL at 02:38

## 2025-06-14 RX ADMIN — SODIUM CHLORIDE 500 MILLILITER(S): 9 INJECTION, SOLUTION INTRAVENOUS at 06:28

## 2025-06-14 RX ADMIN — Medication 100 MILLIGRAM(S): at 02:38

## 2025-06-14 RX ADMIN — Medication 4 MILLILITER(S): at 02:38

## 2025-06-14 NOTE — ED PROVIDER NOTE - EKG/XRAY ADDITIONAL INFORMATION
EKG shows sinus rhythm with first degree AV Block, no STEMI.   Chest X-rays reviewed and interpreted by me Dr. He and shows no actue findings. No Pneumothorax, no free air, no effusions, and these findings discussed with patient.

## 2025-06-14 NOTE — ED PROVIDER NOTE - OBJECTIVE STATEMENT
59 year old female, past medical history vestibular migraines, htn, hdl, pe on eliquis, who presents with cough. patient with cough x3 weeks w/o improvement despite otc meds. denies f/c, hemoptysis, abd pain, vomiting, diarrhea. patient compliant with ac, +recent travel.

## 2025-06-14 NOTE — ED PROVIDER NOTE - PATIENT PORTAL LINK FT
You can access the FollowMyHealth Patient Portal offered by [facility] by registering at the following website: [email or web address]. By joining [employer]’s FollowMyHealth portal, you will also be able to view your health information using other applications (apps) compatible with our system.

## 2025-06-14 NOTE — ED PROVIDER NOTE - ATTENDING APP SHARED VISIT CONTRIBUTION OF CARE
Patient is c/o cough x 3 wks, not improving with OTC meds. Patient with h/o PE and is on Eliquis. Patient is compliant with her medications. [place] of [place]. Denies lower ext pain/swelling. No trauma. Patient came to ED for continued symptoms and not improving with OTC meds.   Vitals reviewed.   Patient is awake, alert, answering questions appropriately, appears comfortable and not in any distress.  Lungs: CTA, no wheezing, no crackles.  Heart: s1, s2, rrr, no M/G.  Back: No swelling, no redness, no midline vertebral column tenderness, no saddle anasthesia, distally NVI.  Ext: B/L lower ext appears symmetrical, no swelling, no redness, no crepitus, no pain on ROM of the joints, no deformity, and are distally NVI.  CNS: awake, alert, o x 3, no focal neurologic deficits.  A/P: Cough x 3 wks, recent travel, with h/o PE and on Eliquis,   Labs, will obtain Chest CTA to r/o recurrent PE,   reevaluation   Discussed with patient and she agreed.

## 2025-06-14 NOTE — ED PROVIDER NOTE - DIFFERENTIAL DIAGNOSIS
Differential Diagnosis Electrolyte abnormalities, dehydration, SHAHID, hyperglycemia, hypoglycemia, symptomatic anemia.  r/o PE.  r/o cardiac ischemia.

## 2025-06-14 NOTE — ED PROVIDER NOTE - PHYSICAL EXAMINATION
CONSTITUTIONAL: no acute distress   SKIN: skin exam is warm and dry  HEAD: Normocephalic; atraumatic  ENT: mildly erythematous oropharynx, no exudates, uvula midline  CARD: S1, S2 normal, no murmur  RESP: No wheezes, rales or rhonchi. Good air movement bilaterally  ABD: soft; non-distended; non-tender.   EXT: Normal ROM.    NEURO: awake, alert, following commands, oriented, grossly unremarkable. No Focal deficits. GCS 15.   PSYCH: Cooperative, appropriate.

## 2025-06-20 ENCOUNTER — EMERGENCY (EMERGENCY)
Facility: HOSPITAL | Age: 60
LOS: 0 days | Discharge: HOME | End: 2025-06-20
Attending: EMERGENCY MEDICINE
Payer: COMMERCIAL

## 2025-06-20 VITALS
TEMPERATURE: 99 F | WEIGHT: 240.08 LBS | HEIGHT: 66 IN | DIASTOLIC BLOOD PRESSURE: 74 MMHG | SYSTOLIC BLOOD PRESSURE: 137 MMHG | OXYGEN SATURATION: 99 % | RESPIRATION RATE: 18 BRPM | HEART RATE: 84 BPM

## 2025-06-20 DIAGNOSIS — Z91.048 OTHER NONMEDICINAL SUBSTANCE ALLERGY STATUS: ICD-10-CM

## 2025-06-20 DIAGNOSIS — I10 ESSENTIAL (PRIMARY) HYPERTENSION: ICD-10-CM

## 2025-06-20 DIAGNOSIS — E78.5 HYPERLIPIDEMIA, UNSPECIFIED: ICD-10-CM

## 2025-06-20 DIAGNOSIS — Z90.49 ACQUIRED ABSENCE OF OTHER SPECIFIED PARTS OF DIGESTIVE TRACT: Chronic | ICD-10-CM

## 2025-06-20 DIAGNOSIS — R06.02 SHORTNESS OF BREATH: ICD-10-CM

## 2025-06-20 DIAGNOSIS — K21.9 GASTRO-ESOPHAGEAL REFLUX DISEASE WITHOUT ESOPHAGITIS: ICD-10-CM

## 2025-06-20 DIAGNOSIS — Z98.891 HISTORY OF UTERINE SCAR FROM PREVIOUS SURGERY: Chronic | ICD-10-CM

## 2025-06-20 DIAGNOSIS — Z98.890 OTHER SPECIFIED POSTPROCEDURAL STATES: Chronic | ICD-10-CM

## 2025-06-20 DIAGNOSIS — Z86.711 PERSONAL HISTORY OF PULMONARY EMBOLISM: ICD-10-CM

## 2025-06-20 DIAGNOSIS — R05.1 ACUTE COUGH: ICD-10-CM

## 2025-06-20 DIAGNOSIS — Z79.01 LONG TERM (CURRENT) USE OF ANTICOAGULANTS: ICD-10-CM

## 2025-06-20 DIAGNOSIS — M35.00 SJOGREN SYNDROME, UNSPECIFIED: ICD-10-CM

## 2025-06-20 LAB
FLUAV AG NPH QL: SIGNIFICANT CHANGE UP
FLUBV AG NPH QL: SIGNIFICANT CHANGE UP
RSV RNA NPH QL NAA+NON-PROBE: SIGNIFICANT CHANGE UP
SARS-COV-2 RNA SPEC QL NAA+PROBE: SIGNIFICANT CHANGE UP
SOURCE RESPIRATORY: SIGNIFICANT CHANGE UP

## 2025-06-20 PROCEDURE — 0241U: CPT

## 2025-06-20 PROCEDURE — 99284 EMERGENCY DEPT VISIT MOD MDM: CPT

## 2025-06-20 PROCEDURE — 99283 EMERGENCY DEPT VISIT LOW MDM: CPT | Mod: 25

## 2025-06-20 PROCEDURE — 71046 X-RAY EXAM CHEST 2 VIEWS: CPT

## 2025-06-20 PROCEDURE — 71046 X-RAY EXAM CHEST 2 VIEWS: CPT | Mod: 26

## 2025-06-20 PROCEDURE — 94640 AIRWAY INHALATION TREATMENT: CPT

## 2025-06-20 RX ORDER — IPRATROPIUM BROMIDE AND ALBUTEROL SULFATE .5; 2.5 MG/3ML; MG/3ML
3 SOLUTION RESPIRATORY (INHALATION) ONCE
Refills: 0 | Status: COMPLETED | OUTPATIENT
Start: 2025-06-20 | End: 2025-06-20

## 2025-06-20 RX ORDER — PREDNISONE 20 MG/1
2 TABLET ORAL
Qty: 14 | Refills: 0
Start: 2025-06-20 | End: 2025-06-26

## 2025-06-20 RX ORDER — ALBUTEROL SULFATE 2.5 MG/3ML
3 VIAL, NEBULIZER (ML) INHALATION
Qty: 7 | Refills: 0
Start: 2025-06-20 | End: 2025-06-26

## 2025-06-20 RX ORDER — ALBUTEROL SULFATE 2.5 MG/3ML
2 VIAL, NEBULIZER (ML) INHALATION
Qty: 1 | Refills: 0
Start: 2025-06-20 | End: 2025-06-26

## 2025-06-20 RX ORDER — PREDNISONE 20 MG/1
50 TABLET ORAL ONCE
Refills: 0 | Status: COMPLETED | OUTPATIENT
Start: 2025-06-20 | End: 2025-06-20

## 2025-06-20 RX ADMIN — IPRATROPIUM BROMIDE AND ALBUTEROL SULFATE 3 MILLILITER(S): .5; 2.5 SOLUTION RESPIRATORY (INHALATION) at 13:05

## 2025-06-20 RX ADMIN — PREDNISONE 50 MILLIGRAM(S): 20 TABLET ORAL at 12:59

## 2025-06-20 NOTE — ED ADULT TRIAGE NOTE - CHIEF COMPLAINT QUOTE
Cough x 1 month, shortness of breath unrelieved by albuterol, sneezing, phlegm. PT also endorses muscle spasms to back that started last week.   Pt requesting COVID swab. Hx blood clots was seen in ED r/o last week.

## 2025-06-20 NOTE — ED PROVIDER NOTE - ATTENDING APP SHARED VISIT CONTRIBUTION OF CARE
59-year-old female past medical history of RA, Sjogren's disease, PE on Eliquis, hypertension on losartan, hyperlipidemia, presents with complaint of persistent cough for the last month.  Patient says in the last 24 hours she has produced more phlegm, clear.  Patient was seen in the ED 1 week ago for similar symptoms.  Patient had labs and CTA to rule out PE which was negative.  Patient was given dose of Decadron in the ED patient says her symptoms abated for the first few days, but returned.  Patient denies any fever or chills.  Patient is requesting a COVID swab.  Patient has no chest pain.  Patient admits to mildly short of breath.  Patient denies any abdominal pain, back pain, leg swelling or leg pain.  Exam: nad, ncat, perrl, eomi, mmm, rrr, ctab, dry cough, abd soft, nt, nd aox3, no calf tenderness, no pitting edema impression: Patient with history of PE on Eliquis, with 1 month of dry cough.  Symptoms consistent with bronchitis.  Patient already using her albuterol which she was prescribed by her pulmonologist every 4 hours.  Patient advised to continue her albuterol and will give a weeks worth of steroids.  Patient to follow-up with her pulmonologist.

## 2025-06-20 NOTE — ED PROVIDER NOTE - PHYSICAL EXAMINATION
Vital Signs: I have reviewed the initial vital signs.  CONSTITUTIONAL: Pt in no acute distress  SKIN: Skin exam is warm and dry, no acute rash.  HEAD: Normocephalic; atraumatic.  EYES: PERRL, EOM intact; conjunctiva and sclera clear.  ENT: No nasal discharge; airway clear.  NECK: Supple; FROM  CARD: S1, S2 normal; no murmurs, gallops, or rubs. Regular rate and rhythm.  RESP: CTAB. No respiratory distress. No accessory muscle use, pt speaking full sentences.  ABD: soft; non-distended; non-tender; no hepatosplenomegaly.  MSK: Normal ROM. No clubbing, cyanosis or edema.

## 2025-06-20 NOTE — ED PROVIDER NOTE - CARE PROVIDER_API CALL
Curry Castanon  Critical Care Medicine  [address] [place] [address], [facility] [address]  [place] [address]  Phone: [phone]  Fax: [phone]  Follow Up Time:

## 2025-06-20 NOTE — ED PROVIDER NOTE - NSFOLLOWUPINSTRUCTIONS_ED_ALL_ED_FT
Our Emergency Department Referral Coordinators will be reaching out to you in the next 24-48 hours from 9:00am to 5:00pm with a follow up appointment. Please expect a phone call from the hospital in that time frame. If you do not receive a call or if you have any questions or concerns, you can reach them at   [phone]     As discussed in emergency department, take prednisone 40 mg once a day for 7 days.  Follow-up with your primary care doctor and pulmonology referral within 1 week.      Cough    Coughing is a reflex that clears your throat and your airways. Coughing helps to heal and protect your lungs. It is normal to cough occasionally, but a cough that happens with other symptoms or lasts a long time may be a sign of a condition that needs treatment. Coughing may be caused by infections, asthma or COPD, smoking, postnasal drip, gastroesophageal reflux, as well as other medical conditions. Take medicines only as instructed by your health care provider. Avoid environments or triggers that causes you to cough at work or at home.    SEEK IMMEDIATE MEDICAL CARE IF YOU HAVE ANY OF THE FOLLOWING SYMPTOMS: coughing up blood, shortness of breath, rapid heart rate, chest pain, unexplained weight loss or night sweats.

## 2025-06-20 NOTE — ED PROVIDER NOTE - OBJECTIVE STATEMENT
ambulatory 59-year-old female past medical history GERD, HLD, HTN, RA, PE in the past on Eliquis, presents with cough X 1 month.  Patient states he was seen in ED for same symptoms, had negative workup including troponin and CTA chest to rule out PE.  Patient states cough has been lingering and is requesting COVID swab.  Patient states she got one-time dose of dexamethasone in the ED, as well as a course of antibiotics which she states she has completed.  Denies fever, chest pain, shortness of breath.  Patient has no other complaints.

## 2025-06-20 NOTE — ED PROVIDER NOTE - PATIENT PORTAL LINK FT
You can access the FollowMyHealth Patient Portal offered by [facility] by registering at the following website: [email or web address]. By joining [facility]’s FollowMyHealth portal, you will also be able to view your health information using other applications (apps) compatible with our system.

## 2025-06-20 NOTE — ED ADULT TRIAGE NOTE - BP NONINVASIVE DIASTOLIC (MM HG)
10/27/22 Second Attempt  Left a vm for patients son Rufino to please schedule c Med Assay for an INR   Call and let us know once scheduled     74

## 2025-06-20 NOTE — ED PROVIDER NOTE - WHICH SHOWED
ED CXR film, my independent interpretation - Dr. Janice Humphries, attending physician: No significant cardiopulmonary abnormalities, no ptx, no rib fractures

## 2025-06-20 NOTE — ED PROVIDER NOTE - CLINICAL SUMMARY MEDICAL DECISION MAKING FREE TEXT BOX
Patient with history of PE on Eliquis, with 1 month of dry cough.  Symptoms consistent with bronchitis.  Patient already using her albuterol which she was prescribed by her pulmonologist every 4 hours.  Patient advised to continue her albuterol and will give a weeks worth of steroids.  Patient to follow-up with her pulmonologist.

## 2025-06-20 NOTE — ED PROVIDER NOTE - NSICDXPASTSURGICALHX_GEN_ALL_CORE_FT
PAST SURGICAL HISTORY:  H/O eye surgery     History of      History of repair of ACL     S/P laparoscopic cholecystectomy     Single delivery by  section     
[id number]

## 2025-07-09 ENCOUNTER — APPOINTMENT (OUTPATIENT)
Dept: PULMONOLOGY | Facility: CLINIC | Age: 60
End: 2025-07-09
Payer: COMMERCIAL

## 2025-07-09 ENCOUNTER — LABORATORY RESULT (OUTPATIENT)
Age: 60
End: 2025-07-09

## 2025-07-09 VITALS
OXYGEN SATURATION: 99 % | WEIGHT: 247 LBS | HEART RATE: 54 BPM | SYSTOLIC BLOOD PRESSURE: 128 MMHG | DIASTOLIC BLOOD PRESSURE: 84 MMHG | BODY MASS INDEX: 39.87 KG/M2

## 2025-07-09 PROBLEM — J45.30 MILD PERSISTENT ASTHMA: Status: ACTIVE | Noted: 2025-07-09

## 2025-07-09 PROBLEM — J45.20 INTERMITTENT ASTHMA: Status: RESOLVED | Noted: 2025-02-03 | Resolved: 2025-07-09

## 2025-07-09 PROCEDURE — 99214 OFFICE O/P EST MOD 30 MIN: CPT

## 2025-07-09 PROCEDURE — G2211 COMPLEX E/M VISIT ADD ON: CPT | Mod: NC

## 2025-07-14 LAB
A ALTERNATA IGE QN: 25.1 KUA/L
A FUMIGATUS IGE QN: 3.7 KUA/L
BASOPHILS # BLD AUTO: 0.04 K/UL
BASOPHILS NFR BLD AUTO: 0.9 %
C ALBICANS IGE QN: 2.31 KUA/L
C HERBARUM IGE QN: 5.12 KUA/L
CAT DANDER IGE QN: 0.36 KUA/L
COMMON RAGWEED IGE QN: 2.55 KUA/L
D FARINAE IGE QN: 2.37 KUA/L
D PTERONYSS IGE QN: 2.18 KUA/L
DEPRECATED A ALTERNATA IGE RAST QL: 4
DEPRECATED A FUMIGATUS IGE RAST QL: 3
DEPRECATED C ALBICANS IGE RAST QL: 2
DEPRECATED C HERBARUM IGE RAST QL: 3
DEPRECATED CAT DANDER IGE RAST QL: 1
DEPRECATED COMMON RAGWEED IGE RAST QL: 2
DEPRECATED D FARINAE IGE RAST QL: 2
DEPRECATED D PTERONYSS IGE RAST QL: 2
DEPRECATED DOG DANDER IGE RAST QL: 2
DEPRECATED M RACEMOSUS IGE RAST QL: NORMAL
DEPRECATED ROACH IGE RAST QL: NORMAL
DEPRECATED TIMOTHY IGE RAST QL: 2
DEPRECATED WHITE OAK IGE RAST QL: 3
DOG DANDER IGE QN: 2.05 KUA/L
EOSINOPHIL # BLD AUTO: 0.16 K/UL
EOSINOPHIL NFR BLD AUTO: 3.8 %
HCT VFR BLD CALC: 38.8 %
HGB BLD-MCNC: 12.2 G/DL
IMM GRANULOCYTES NFR BLD AUTO: 0.2 %
LYMPHOCYTES # BLD AUTO: 1.3 K/UL
LYMPHOCYTES NFR BLD AUTO: 30.8 %
M RACEMOSUS IGE QN: 0.19 KUA/L
MAN DIFF?: NORMAL
MCHC RBC-ENTMCNC: 26.7 PG
MCHC RBC-ENTMCNC: 31.4 G/DL
MCV RBC AUTO: 84.9 FL
MONOCYTES # BLD AUTO: 0.35 K/UL
MONOCYTES NFR BLD AUTO: 8.3 %
NEUTROPHILS # BLD AUTO: 2.36 K/UL
NEUTROPHILS NFR BLD AUTO: 56 %
PLATELET # BLD AUTO: 266 K/UL
PMV BLD AUTO: 0 /100 WBCS
PMV BLD: 10.9 FL
RBC # BLD: 4.57 M/UL
RBC # FLD: 16.7 %
ROACH IGE QN: 0.33 KUA/L
TIMOTHY IGE QN: 3.06 KUA/L
TOTAL IGE SMQN RAST: 706 KU/L
WBC # FLD AUTO: 4.22 K/UL
WHITE OAK IGE QN: 6 KUA/L

## 2025-09-02 ENCOUNTER — APPOINTMENT (OUTPATIENT)
Dept: PULMONOLOGY | Facility: CLINIC | Age: 60
End: 2025-09-02
Payer: COMMERCIAL

## 2025-09-02 VITALS
OXYGEN SATURATION: 97 % | DIASTOLIC BLOOD PRESSURE: 78 MMHG | BODY MASS INDEX: 39.87 KG/M2 | SYSTOLIC BLOOD PRESSURE: 114 MMHG | WEIGHT: 247 LBS | HEART RATE: 82 BPM

## 2025-09-02 DIAGNOSIS — G47.33 OBSTRUCTIVE SLEEP APNEA (ADULT) (PEDIATRIC): ICD-10-CM

## 2025-09-02 DIAGNOSIS — J45.30 MILD PERSISTENT ASTHMA, UNCOMPLICATED: ICD-10-CM

## 2025-09-02 DIAGNOSIS — R09.82 POSTNASAL DRIP: ICD-10-CM

## 2025-09-02 DIAGNOSIS — I26.99 OTHER PULMONARY EMBOLISM W/OUT ACUTE COR PULMONALE: ICD-10-CM

## 2025-09-02 DIAGNOSIS — J45.40 MODERATE PERSISTENT ASTHMA, UNCOMPLICATED: ICD-10-CM

## 2025-09-02 PROCEDURE — G2211 COMPLEX E/M VISIT ADD ON: CPT | Mod: NC

## 2025-09-02 PROCEDURE — 99214 OFFICE O/P EST MOD 30 MIN: CPT

## 2025-09-02 RX ORDER — PREDNISONE 20 MG/1
20 TABLET ORAL
Qty: 15 | Refills: 0 | Status: ACTIVE | COMMUNITY
Start: 2025-09-02 | End: 1900-01-01

## 2025-09-02 RX ORDER — BUDESONIDE AND FORMOTEROL FUMARATE DIHYDRATE 160; 4.5 UG/1; UG/1
160-4.5 AEROSOL RESPIRATORY (INHALATION) TWICE DAILY
Qty: 1 | Refills: 3 | Status: ACTIVE | COMMUNITY
Start: 2025-09-02 | End: 1900-01-01